# Patient Record
Sex: FEMALE | Race: BLACK OR AFRICAN AMERICAN
[De-identification: names, ages, dates, MRNs, and addresses within clinical notes are randomized per-mention and may not be internally consistent; named-entity substitution may affect disease eponyms.]

---

## 2017-07-26 ENCOUNTER — HOSPITAL ENCOUNTER (INPATIENT)
Dept: HOSPITAL 62 - 5 | Age: 77
LOS: 7 days | Discharge: HOME | DRG: 684 | End: 2017-08-02
Attending: INTERNAL MEDICINE | Admitting: INTERNAL MEDICINE
Payer: MEDICARE

## 2017-07-26 DIAGNOSIS — F32.9: ICD-10-CM

## 2017-07-26 DIAGNOSIS — Z79.899: ICD-10-CM

## 2017-07-26 DIAGNOSIS — E78.5: ICD-10-CM

## 2017-07-26 DIAGNOSIS — Z87.891: ICD-10-CM

## 2017-07-26 DIAGNOSIS — Z79.4: ICD-10-CM

## 2017-07-26 DIAGNOSIS — D50.9: ICD-10-CM

## 2017-07-26 DIAGNOSIS — I12.9: ICD-10-CM

## 2017-07-26 DIAGNOSIS — E11.22: ICD-10-CM

## 2017-07-26 DIAGNOSIS — N18.4: ICD-10-CM

## 2017-07-26 DIAGNOSIS — D63.1: ICD-10-CM

## 2017-07-26 DIAGNOSIS — Z86.73: ICD-10-CM

## 2017-07-26 DIAGNOSIS — Z95.5: ICD-10-CM

## 2017-07-26 DIAGNOSIS — Z82.49: ICD-10-CM

## 2017-07-26 DIAGNOSIS — I48.2: ICD-10-CM

## 2017-07-26 DIAGNOSIS — I25.2: ICD-10-CM

## 2017-07-26 DIAGNOSIS — Z83.3: ICD-10-CM

## 2017-07-26 DIAGNOSIS — I25.10: ICD-10-CM

## 2017-07-26 DIAGNOSIS — N17.9: Primary | ICD-10-CM

## 2017-07-26 LAB
AMYLASE SERPL-CCNC: 194 U/L (ref 30–110)
ANION GAP SERPL CALC-SCNC: 13 MMOL/L (ref 5–19)
APPEARANCE UR: CLEAR
APTT BLD: 35.8 SEC (ref 23.5–35.8)
BARBITURATES UR QL SCN: NEGATIVE
BILIRUB UR QL STRIP: NEGATIVE
BUN SERPL-MCNC: 46 MG/DL (ref 7–20)
CALCIUM: 9.2 MG/DL (ref 8.4–10.2)
CHLORIDE SERPL-SCNC: 101 MMOL/L (ref 98–107)
CK MB SERPL-MCNC: 0.25 NG/ML (ref ?–4.55)
CK MB SERPL-MCNC: 0.27 NG/ML (ref ?–4.55)
CK SERPL-CCNC: 75 U/L (ref 30–135)
CO2 SERPL-SCNC: 28 MMOL/L (ref 22–30)
CREAT SERPL-MCNC: 2.27 MG/DL (ref 0.52–1.25)
CREAT UR-MCNC: 39.1 MG/DL (ref 15–278)
ERYTHROCYTE [DISTWIDTH] IN BLOOD BY AUTOMATED COUNT: 17.9 % (ref 11.5–14)
GLUCOSE SERPL-MCNC: 190 MG/DL (ref 75–110)
GLUCOSE UR STRIP-MCNC: 50 MG/DL
HCT VFR BLD CALC: 24.8 % (ref 36–47)
HGB BLD-MCNC: 8.2 G/DL (ref 12–15.5)
HGB HCT DIFFERENCE: -0.2
KETONES UR STRIP-MCNC: NEGATIVE MG/DL
LIPASE SERPL-CCNC: 483.2 U/L (ref 23–300)
MAGNESIUM SERPL-MCNC: 2.5 MG/DL (ref 1.6–2.3)
MCH RBC QN AUTO: 27.3 PG (ref 27–33.4)
MCHC RBC AUTO-ENTMCNC: 32.9 G/DL (ref 32–36)
MCV RBC AUTO: 83 FL (ref 80–97)
METHADONE UR QL SCN: NEGATIVE
NITRITE UR QL STRIP: NEGATIVE
PCP UR QL SCN: NEGATIVE
PH UR STRIP: 7 [PH] (ref 5–9)
PHOSPHATE SERPL-MCNC: 4.6 MG/DL (ref 2.5–4.5)
POTASSIUM SERPL-SCNC: 3.3 MMOL/L (ref 3.6–5)
POTASSIUM UR-SCNC: 13.3 MMOL/L (ref 22–164)
PROT UR STRIP-MCNC: 396.4 MG/DL (ref ?–12)
PROT UR STRIP-MCNC: >=500 MG/DL
PROTHROMBIN TIME: 13.8 SEC (ref 11.4–15.4)
RBC # BLD AUTO: 2.99 10^6/UL (ref 3.72–5.28)
SODIUM SERPL-SCNC: 142.3 MMOL/L (ref 137–145)
SP GR UR STRIP: 1.01
TROPONIN I SERPL-MCNC: 0.02 NG/ML
TROPONIN I SERPL-MCNC: 0.02 NG/ML
TSH SERPL-ACNC: 1.7 UIU/ML (ref 0.47–4.68)
URINE OPIATES LOW: NEGATIVE
UROBILINOGEN UR-MCNC: NEGATIVE MG/DL (ref ?–2)
WBC # BLD AUTO: 9.4 10^3/UL (ref 4–10.5)

## 2017-07-26 PROCEDURE — 80048 BASIC METABOLIC PNL TOTAL CA: CPT

## 2017-07-26 PROCEDURE — 36415 COLL VENOUS BLD VENIPUNCTURE: CPT

## 2017-07-26 PROCEDURE — 84300 ASSAY OF URINE SODIUM: CPT

## 2017-07-26 PROCEDURE — 82728 ASSAY OF FERRITIN: CPT

## 2017-07-26 PROCEDURE — 83874 ASSAY OF MYOGLOBIN: CPT

## 2017-07-26 PROCEDURE — 85025 COMPLETE CBC W/AUTO DIFF WBC: CPT

## 2017-07-26 PROCEDURE — 82962 GLUCOSE BLOOD TEST: CPT

## 2017-07-26 PROCEDURE — 81001 URINALYSIS AUTO W/SCOPE: CPT

## 2017-07-26 PROCEDURE — 80053 COMPREHEN METABOLIC PANEL: CPT

## 2017-07-26 PROCEDURE — 83735 ASSAY OF MAGNESIUM: CPT

## 2017-07-26 PROCEDURE — 84484 ASSAY OF TROPONIN QUANT: CPT

## 2017-07-26 PROCEDURE — 84156 ASSAY OF PROTEIN URINE: CPT

## 2017-07-26 PROCEDURE — 82784 ASSAY IGA/IGD/IGG/IGM EACH: CPT

## 2017-07-26 PROCEDURE — 87040 BLOOD CULTURE FOR BACTERIA: CPT

## 2017-07-26 PROCEDURE — 84166 PROTEIN E-PHORESIS/URINE/CSF: CPT

## 2017-07-26 PROCEDURE — 82150 ASSAY OF AMYLASE: CPT

## 2017-07-26 PROCEDURE — 80307 DRUG TEST PRSMV CHEM ANLYZR: CPT

## 2017-07-26 PROCEDURE — 85027 COMPLETE CBC AUTOMATED: CPT

## 2017-07-26 PROCEDURE — 82570 ASSAY OF URINE CREATININE: CPT

## 2017-07-26 PROCEDURE — 87077 CULTURE AEROBIC IDENTIFY: CPT

## 2017-07-26 PROCEDURE — 83036 HEMOGLOBIN GLYCOSYLATED A1C: CPT

## 2017-07-26 PROCEDURE — 83550 IRON BINDING TEST: CPT

## 2017-07-26 PROCEDURE — 85610 PROTHROMBIN TIME: CPT

## 2017-07-26 PROCEDURE — 76775 US EXAM ABDO BACK WALL LIM: CPT

## 2017-07-26 PROCEDURE — 84133 ASSAY OF URINE POTASSIUM: CPT

## 2017-07-26 PROCEDURE — 87186 SC STD MICRODIL/AGAR DIL: CPT

## 2017-07-26 PROCEDURE — 84439 ASSAY OF FREE THYROXINE: CPT

## 2017-07-26 PROCEDURE — 84100 ASSAY OF PHOSPHORUS: CPT

## 2017-07-26 PROCEDURE — 83540 ASSAY OF IRON: CPT

## 2017-07-26 PROCEDURE — 85730 THROMBOPLASTIN TIME PARTIAL: CPT

## 2017-07-26 PROCEDURE — 82553 CREATINE MB FRACTION: CPT

## 2017-07-26 PROCEDURE — 83690 ASSAY OF LIPASE: CPT

## 2017-07-26 PROCEDURE — 84443 ASSAY THYROID STIM HORMONE: CPT

## 2017-07-26 PROCEDURE — 82550 ASSAY OF CK (CPK): CPT

## 2017-07-26 PROCEDURE — 87086 URINE CULTURE/COLONY COUNT: CPT

## 2017-07-26 PROCEDURE — 84165 PROTEIN E-PHORESIS SERUM: CPT

## 2017-07-26 PROCEDURE — 86320 SERUM IMMUNOELECTROPHORESIS: CPT

## 2017-07-26 PROCEDURE — 86335 IMMUNFIX E-PHORSIS/URINE/CSF: CPT

## 2017-07-26 PROCEDURE — 82140 ASSAY OF AMMONIA: CPT

## 2017-07-26 RX ADMIN — APIXABAN SCH MG: 2.5 TABLET, FILM COATED ORAL at 17:38

## 2017-07-26 RX ADMIN — ATORVASTATIN CALCIUM SCH MG: 20 TABLET, FILM COATED ORAL at 21:13

## 2017-07-26 RX ADMIN — SODIUM CHLORIDE PRN ML: 9 INJECTION, SOLUTION INTRAVENOUS at 17:39

## 2017-07-26 RX ADMIN — INSULIN LISPRO SCH UNIT: 100 INJECTION, SOLUTION INTRAVENOUS; SUBCUTANEOUS at 21:13

## 2017-07-26 NOTE — PDOC H&P
History of Present Illness


Admission Date/PCP: 


  07/26/17 12:35





  AVIVA POTTER MD





History of Present Illness: 


ASHLEY FUENTES is a 76 year old female, She was admitted directly from 

home because of acute kidney injury.  She had blood work yesterday, the serum 

creatinine was 2.31, the serum creatinine from 6/27/2017 was 2.01, the serum 

creatinine from 03/20/27 was 1.64 there was progressive decline in the 

estimated GFR.  The estimated GFR from 03/20/2017 was 33,, the estimated GFR 

from 06/27/2017 was 26, the estimated GFR from 07/25/2017 was 22 because of the 

rapid decline in the kidney function she was admitted directly to evaluate out 

for acute kidney injury to rule out any prerenal component or any other 

condition that could be contributing to the rapid decline kidney function.  She 

has a history of diabetes mellitus with nephropathy.  She has other comorbid 

conditions including CVA, chronic atrial fibrillation.








Past Medical History


Cardiac Medical History: Reports: Atrial Fibrillation, Coronary Artery Disease, 

Hyperlipidema, Hypertension


Neurological Medical History: Reports: Ischemic CVA


Endocrine Medical History: Reports: Diabetes Mellitus Type 2


Psychiatric Medical History: Reports: Depression





Past Surgical History


Past Surgical History: Reports: Cardiac Catheterization - stent 2004


   Denies: Hysterectomy, Pacemaker





Social History


Smoking Status: Former Smoker


Frequency of Alcohol Use: None


Hx Recreational Drug Use: No


Hx Prescription Drug Abuse: No





Family History


Parental Family History Reviewed: Yes


Children Family History Reviewed: Yes


Sibling(s) Family History Reviewed.: Yes





Medication/Allergy


Home Medications: 








Apixaban [Eliquis 2.5 mg Tablet] 2.5 mg PO BID 07/26/17 


Insulin Aspart [Novolog Flexpen] 8 units SUBCUT QHS 07/26/17 


Insulin Glargine,Hum.rec.anlog [Lantus Insulin 100 Unit/mL] 15 units SUBCUT QAM 

07/26/17 


Linaclotide [Linzess] 290 mcg PO DAILY 07/26/17 


Pioglitazone HCl [Actos] 30 mg PO DAILY 07/26/17 


RX: Amlodipine Besylate [Norvasc 10 mg Tablet] 10 mg PO DAILY 07/26/17 


RX: Ergocalciferol (Vitamin D2) [Drisdol 50,000 unit (1.25MG) Capsule] 50,000 

unit PO WE@1000 07/26/17 


RX: Metoprolol Succinate [Toprol  mg Tablet] 200 mg PO DAILY 07/26/17 


RX: Omeprazole 40 mg PO DAILY 07/26/17 


RX: Pravastatin Sodium [Pravachol] 80 mg PO DAILY 07/26/17 


RX: Sertraline HCl [Zoloft] 100 mg PO DAILY 07/26/17 


Tramadol HCl/Acetaminophen [Ultracet 37.5 mg/325 mg Tablet] 1 tab PO Q6HP PRN 07 /26/17 


Valsartan/Hydrochlorothiazide [Diovan Hct 320-25 mg Tablet] 1 tab PO DAILY 07/26 /17 








Allergies/Adverse Reactions: 


 





No Known Allergies Allergy (Unverified 12/02/11 18:04)


 











Physical Exam


Vital Signs: 


 











Temp Pulse Resp BP Pulse Ox


 


 98.0 F   70   18   170/64 H  100 


 


 07/26/17 14:25  07/26/17 14:25  07/26/17 14:25  07/26/17 14:25  07/26/17 14:25








 Intake & Output











 07/25/17 07/26/17 07/27/17





 06:59 06:59 06:59


 


Weight   59.2 kg











General appearance: PRESENT: no acute distress


Head exam: PRESENT: atraumatic, normocephalic


Eye exam: PRESENT: conjunctiva pink, EOMI, PERRLA


Ear exam: PRESENT: normal external ear exam


Mouth exam: PRESENT: moist, tongue midline


Neck exam: PRESENT: full ROM


Cardiovascular exam: PRESENT: RRR, +S1, +S2


Vascular exam: PRESENT: normal capillary refill


GI/Abdominal exam: PRESENT: normal bowel sounds, soft


Rectal exam: PRESENT: deferred


Neurological exam: PRESENT: alert, motor sensory deficit


Psychiatric exam: PRESENT: appropriate affect, normal mood


Skin exam: PRESENT: dry, intact, warm





Results


Laboratory Results: 


 





 07/26/17 13:27 





 07/26/17 13:27 





 











  07/26/17 07/26/17 07/26/17





  13:27 13:27 13:27


 


WBC  9.4  


 


RBC  2.99 L  


 


Hgb  8.2 L  


 


Hct  24.8 L  


 


MCV  83  


 


MCH  27.3  


 


MCHC  32.9  


 


RDW  17.9 H  


 


Plt Count  265  


 


Sodium   142.3 


 


Potassium   3.3 L 


 


Chloride   101 


 


Carbon Dioxide   28 


 


Anion Gap   13 


 


BUN   46 H 


 


Creatinine   2.27 H 


 


Est GFR ( Amer)   25 L 


 


Est GFR (Non-Af Amer)   21 L 


 


Glucose   190 H 


 


Calcium   9.2 


 


Phosphorus    4.6 H


 


Magnesium    2.5 H


 


Ammonia   


 


Amylase    194 H


 


Lipase    483.2 H


 


TSH   


 


Free T4   


 


Urine Color   


 


Urine Appearance   


 


Urine pH   


 


Ur Specific Gravity   


 


Urine Protein   


 


Urine Glucose (UA)   


 


Urine Ketones   


 


Urine Blood   


 


Urine Nitrite   


 


Ur Leukocyte Esterase   


 


Urine WBC (Auto)   


 


Urine RBC (Auto)   














  07/26/17 07/26/17 07/26/17





  13:27 14:26 16:33


 


WBC   


 


RBC   


 


Hgb   


 


Hct   


 


MCV   


 


MCH   


 


MCHC   


 


RDW   


 


Plt Count   


 


Sodium   


 


Potassium   


 


Chloride   


 


Carbon Dioxide   


 


Anion Gap   


 


BUN   


 


Creatinine   


 


Est GFR ( Amer)   


 


Est GFR (Non-Af Amer)   


 


Glucose   


 


Calcium   


 


Phosphorus   


 


Magnesium   


 


Ammonia    < 8.7 L


 


Amylase   


 


Lipase   


 


TSH  1.70  


 


Free T4  1.60  


 


Urine Color   STRAW 


 


Urine Appearance   CLEAR 


 


Urine pH   7.0 


 


Ur Specific Gravity   1.007 


 


Urine Protein   >=500 H 


 


Urine Glucose (UA)   50 H 


 


Urine Ketones   NEGATIVE 


 


Urine Blood   NEGATIVE 


 


Urine Nitrite   NEGATIVE 


 


Ur Leukocyte Esterase   NEGATIVE 


 


Urine WBC (Auto)   5 


 


Urine RBC (Auto)   2 








 











  07/26/17 07/26/17 07/26/17





  13:27 13:27 13:27


 


Creatine Kinase  75  Cancelled 


 


CK-MB (CK-2)    0.27


 


Troponin I    0.018














Assessment & Plan





- Diagnosis


(1) Acute kidney injury


Is this a current diagnosis for this admission?: YesPlan: 


She has chronic kidney disease with nephropathy from diabetes, there is rapid 

progressive decline of kidney function, she was admitted to rule out any 

potential etiology of acute kidney injury, she has significant proteinuria, we 

need to rule out paraproteinemia especially multiple myeloma








(2) Chronic atrial fibrillation


Is this a current diagnosis for this admission?: Yes





(3) Diabetes mellitus


Qualifiers: 


     Diabetes mellitus type: type 2     Diabetes mellitus complication status: 

with kidney complications     Diabetes mellitus complication detail: with 

chronic kidney disease     Diabetes mellitus long term insulin use: with long 

term use     Chronic kidney disease stage: stage 4 (severe)        Qualified 

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

; N18.4 - Chronic kidney disease, stage 4 (severe); Z79.4 - Long term (current) 

use of insulin  


Is this a current diagnosis for this admission?: Yes

## 2017-07-27 LAB
ALBUMIN SERPL-MCNC: 3.4 G/DL (ref 3.5–5)
ALBUMIN SERPL-MCNC: 3.5 G/DL (ref 3.5–5)
ALP SERPL-CCNC: 51 U/L (ref 38–126)
ALP SERPL-CCNC: 69 U/L (ref 38–126)
ALT SERPL-CCNC: 24 U/L (ref 9–52)
ALT SERPL-CCNC: 24 U/L (ref 9–52)
ANION GAP SERPL CALC-SCNC: 10 MMOL/L (ref 5–19)
ANION GAP SERPL CALC-SCNC: 13 MMOL/L (ref 5–19)
AST SERPL-CCNC: 14 U/L (ref 14–36)
AST SERPL-CCNC: 14 U/L (ref 14–36)
BASOPHILS # BLD AUTO: 0.1 10^3/UL (ref 0–0.2)
BASOPHILS NFR BLD AUTO: 0.7 % (ref 0–2)
BILIRUB DIRECT SERPL-MCNC: 0.3 MG/DL (ref 0–0.4)
BILIRUB DIRECT SERPL-MCNC: 0.3 MG/DL (ref 0–0.4)
BILIRUB SERPL-MCNC: 0.3 MG/DL (ref 0.2–1.3)
BILIRUB SERPL-MCNC: 0.4 MG/DL (ref 0.2–1.3)
BUN SERPL-MCNC: 34 MG/DL (ref 7–20)
BUN SERPL-MCNC: 38 MG/DL (ref 7–20)
CALCIUM: 8.6 MG/DL (ref 8.4–10.2)
CALCIUM: 8.8 MG/DL (ref 8.4–10.2)
CHLORIDE SERPL-SCNC: 103 MMOL/L (ref 98–107)
CHLORIDE SERPL-SCNC: 106 MMOL/L (ref 98–107)
CK MB SERPL-MCNC: 0.33 NG/ML (ref ?–4.55)
CK SERPL-CCNC: 70 U/L (ref 30–135)
CO2 SERPL-SCNC: 26 MMOL/L (ref 22–30)
CO2 SERPL-SCNC: 27 MMOL/L (ref 22–30)
CREAT SERPL-MCNC: 1.84 MG/DL (ref 0.52–1.25)
CREAT SERPL-MCNC: 1.92 MG/DL (ref 0.52–1.25)
EOSINOPHIL # BLD AUTO: 0.2 10^3/UL (ref 0–0.6)
EOSINOPHIL NFR BLD AUTO: 2.2 % (ref 0–6)
ERYTHROCYTE [DISTWIDTH] IN BLOOD BY AUTOMATED COUNT: 17.5 % (ref 11.5–14)
GLUCOSE SERPL-MCNC: 114 MG/DL (ref 75–110)
GLUCOSE SERPL-MCNC: 191 MG/DL (ref 75–110)
HCT VFR BLD CALC: 25.8 % (ref 36–47)
HGB BLD-MCNC: 8.4 G/DL (ref 12–15.5)
HGB HCT DIFFERENCE: -0.6
LYMPHOCYTES # BLD AUTO: 2.2 10^3/UL (ref 0.5–4.7)
LYMPHOCYTES NFR BLD AUTO: 26.4 % (ref 13–45)
MCH RBC QN AUTO: 27.4 PG (ref 27–33.4)
MCHC RBC AUTO-ENTMCNC: 32.4 G/DL (ref 32–36)
MCV RBC AUTO: 85 FL (ref 80–97)
MONOCYTES # BLD AUTO: 0.6 10^3/UL (ref 0.1–1.4)
MONOCYTES NFR BLD AUTO: 7.6 % (ref 3–13)
NEUTROPHILS # BLD AUTO: 5.3 10^3/UL (ref 1.7–8.2)
NEUTS SEG NFR BLD AUTO: 63.1 % (ref 42–78)
POTASSIUM SERPL-SCNC: 2.9 MMOL/L (ref 3.6–5)
POTASSIUM SERPL-SCNC: 3.4 MMOL/L (ref 3.6–5)
PROT SERPL-MCNC: 6.4 G/DL (ref 6.3–8.2)
PROT SERPL-MCNC: 6.7 G/DL (ref 6.3–8.2)
RBC # BLD AUTO: 3.05 10^6/UL (ref 3.72–5.28)
SODIUM SERPL-SCNC: 142.1 MMOL/L (ref 137–145)
SODIUM SERPL-SCNC: 142.5 MMOL/L (ref 137–145)
TROPONIN I SERPL-MCNC: 0.02 NG/ML
WBC # BLD AUTO: 8.4 10^3/UL (ref 4–10.5)

## 2017-07-27 RX ADMIN — INSULIN LISPRO SCH UNIT: 100 INJECTION, SOLUTION INTRAVENOUS; SUBCUTANEOUS at 21:22

## 2017-07-27 RX ADMIN — POTASSIUM CHLORIDE SCH MEQ: 1.5 SOLUTION ORAL at 10:14

## 2017-07-27 RX ADMIN — POTASSIUM CHLORIDE SCH MEQ: 1.5 SOLUTION ORAL at 06:10

## 2017-07-27 RX ADMIN — INSULIN GLARGINE SCH UNIT: 100 INJECTION, SOLUTION SUBCUTANEOUS at 07:49

## 2017-07-27 RX ADMIN — APIXABAN SCH MG: 2.5 TABLET, FILM COATED ORAL at 10:20

## 2017-07-27 RX ADMIN — ATORVASTATIN CALCIUM SCH MG: 20 TABLET, FILM COATED ORAL at 21:20

## 2017-07-27 RX ADMIN — LANSOPRAZOLE SCH MG: 30 TABLET, ORALLY DISINTEGRATING, DELAYED RELEASE ORAL at 10:15

## 2017-07-27 RX ADMIN — AMLODIPINE BESYLATE SCH MG: 10 TABLET ORAL at 10:20

## 2017-07-27 RX ADMIN — SERTRALINE HYDROCHLORIDE SCH MG: 50 TABLET ORAL at 10:21

## 2017-07-27 RX ADMIN — METOPROLOL SUCCINATE SCH MG: 50 TABLET, EXTENDED RELEASE ORAL at 10:19

## 2017-07-27 RX ADMIN — APIXABAN SCH MG: 2.5 TABLET, FILM COATED ORAL at 17:13

## 2017-07-27 NOTE — RADIOLOGY REPORT (SQ)
EXAM DESCRIPTION:  [facility]



COMPLETED DATE/TIME:  7/27/2017 12:16 pm



REASON FOR STUDY:  ACUTE KIDNEY INJURY



COMPARISON:  None.



TECHNIQUE:  Dynamic and static grayscale images acquired of the kidneys and bladder and recorded on P
ACS. Additional selected color Doppler and spectral images recorded.



LIMITATIONS:  None.



FINDINGS:  RIGHT KIDNEY:  9.2 cm.   1 cm cyst upper pole.   No solid or suspicious masses.   No hydro
nephrosis.   No calcifications.

LEFT KIDNEY:  9.5 cm.   Normal echogenicity.   No solid or suspicious masses.   No hydronephrosis.   
No calcifications.

BLADDER: No masses.

OTHER FINDINGS: No other significant finding.



IMPRESSION:  No hydronephrosis.  No evidence of hematoma.



TECHNICAL DOCUMENTATION:  JOB ID:  [id number]

 2011 [employer]- All Rights Reserved

## 2017-07-27 NOTE — PDOC PROGRESS REPORT
Subjective


Progress Note for:: 07/27/17


Subjective:: 


She was admitted yesterday because of acute kidney injury superimposed on 

chronic kidney disease, she has nephrotic range proteinuria, the urine protein 

electrophoresis is pending, consultation will be requested from nephrology, she 

is on IV fluid that is slight improvement with a serum creatinine suggesting a 

prerenal component,





Physical Exam


Vital Signs: 


 











Temp Pulse Resp BP Pulse Ox


 


 98.5 F   65   16   159/69 H  95 


 


 07/27/17 15:04  07/27/17 19:00  07/27/17 15:04  07/27/17 15:04  07/27/17 15:04








 Intake & Output











 07/26/17 07/27/17 07/28/17





 06:59 06:59 06:59


 


Intake Total  2218 2120


 


Balance  2218 2120


 


Weight  60.5 kg 











General appearance: PRESENT: no acute distress


Eye exam: PRESENT: PERRLA


Respiratory exam: PRESENT: clear to auscultation emilia


Cardiovascular exam: PRESENT: +S1, +S2


GI/Abdominal exam: PRESENT: soft


Neurological exam: PRESENT: alert, CN II-XII grossly intact





Results


Laboratory Results: 


 





 07/27/17 03:50 





 07/27/17 03:50 





 











  07/26/17 07/27/17 07/27/17





  21:52 03:50 03:50


 


WBC   8.4 


 


RBC   3.05 L 


 


Hgb   8.4 L 


 


Hct   25.8 L 


 


MCV   85 


 


MCH   27.4 


 


MCHC   32.4 


 


RDW   17.5 H 


 


Plt Count   196 


 


Seg Neutrophils %   63.1 


 


Lymphocytes %   26.4 


 


Monocytes %   7.6 


 


Eosinophils %   2.2 


 


Basophils %   0.7 


 


Absolute Neutrophils   5.3 


 


Absolute Lymphocytes   2.2 


 


Absolute Monocytes   0.6 


 


Absolute Eosinophils   0.2 


 


Absolute Basophils   0.1 


 


Sodium    142.1


 


Potassium    2.9 L*


 


Chloride    103


 


Carbon Dioxide    26


 


Anion Gap    13


 


BUN    38 H


 


Creatinine    1.92 H


 


Est GFR ( Amer)    31 L


 


Est GFR (Non-Af Amer)    26 L


 


Glucose    114 H


 


Calcium    8.8


 


Total Bilirubin    0.4


 


AST    14


 


ALT    24


 


Alkaline Phosphatase    51


 


Total Protein  Cancelled   6.4


 


Albumin  Cancelled   3.4 L








 











  07/26/17 07/26/17 07/26/17





  13:27 13:27 13:27


 


Creatine Kinase  75  Cancelled 


 


CK-MB (CK-2)    0.27


 


Troponin I    0.018














  07/26/17 07/26/17 07/27/17





  21:40 21:52 03:50


 


Creatine Kinase   76 


 


CK-MB (CK-2)  0.25   0.33


 


Troponin I  0.022   0.022














  07/27/17





  03:50


 


Creatine Kinase  70


 


CK-MB (CK-2) 


 


Troponin I 











Impressions: 


 





Renal Ultrasound  07/27/17 00:00


IMPRESSION:  No hydronephrosis.  No evidence of hematoma.


 














Assessment & Plan





- Diagnosis


(1) Acute kidney injury


Is this a current diagnosis for this admission?: Yes





(2) Chronic atrial fibrillation


Is this a current diagnosis for this admission?: Yes





(3) Diabetes mellitus


Qualifiers: 


     Diabetes mellitus type: type 2     Diabetes mellitus complication status: 

with kidney complications     Diabetes mellitus complication detail: with 

chronic kidney disease     Diabetes mellitus long term insulin use: with long 

term use     Chronic kidney disease stage: stage 4 (severe)        Qualified 

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

; N18.1 - Chronic kidney disease, stage 1; Z79.4 - Long term (current) use of 

insulin  


Is this a current diagnosis for this admission?: Yes





(4) Nephrotic range proteinuria


Is this a current diagnosis for this admission?: YesPlan: 


She has nephrotic range proteinuria, the urine protein creatinine ratio is 10 

this correlates to 10 g protein in 24 hours

## 2017-07-28 LAB
ALBUMIN SERPL-MCNC: 3.8 G/DL (ref 3.5–5)
ALBUMIN UR: 61.5 %
ALP SERPL-CCNC: 58 U/L (ref 38–126)
ALT SERPL-CCNC: 16 U/L (ref 9–52)
ANION GAP SERPL CALC-SCNC: 12 MMOL/L (ref 5–19)
AST SERPL-CCNC: 15 U/L (ref 14–36)
BASOPHILS # BLD AUTO: 0 10^3/UL (ref 0–0.2)
BASOPHILS NFR BLD AUTO: 0.4 % (ref 0–2)
BILIRUB DIRECT SERPL-MCNC: 0.3 MG/DL (ref 0–0.4)
BILIRUB SERPL-MCNC: 0.3 MG/DL (ref 0.2–1.3)
BUN SERPL-MCNC: 26 MG/DL (ref 7–20)
CALCIUM: 9 MG/DL (ref 8.4–10.2)
CHLORIDE SERPL-SCNC: 107 MMOL/L (ref 98–107)
CO2 SERPL-SCNC: 26 MMOL/L (ref 22–30)
CREAT SERPL-MCNC: 1.53 MG/DL (ref 0.52–1.25)
EOSINOPHIL # BLD AUTO: 0.2 10^3/UL (ref 0–0.6)
EOSINOPHIL NFR BLD AUTO: 2.2 % (ref 0–6)
ERYTHROCYTE [DISTWIDTH] IN BLOOD BY AUTOMATED COUNT: 17.7 % (ref 11.5–14)
GAMMA GLOB 24H MFR UR ELPH: 10.3 %
GLUCOSE SERPL-MCNC: 95 MG/DL (ref 75–110)
HCT VFR BLD CALC: 26.5 % (ref 36–47)
HGB BLD-MCNC: 8.9 G/DL (ref 12–15.5)
HGB HCT DIFFERENCE: 0.2
LYMPHOCYTES # BLD AUTO: 2 10^3/UL (ref 0.5–4.7)
LYMPHOCYTES NFR BLD AUTO: 22.2 % (ref 13–45)
MCH RBC QN AUTO: 27.9 PG (ref 27–33.4)
MCHC RBC AUTO-ENTMCNC: 33.6 G/DL (ref 32–36)
MCV RBC AUTO: 83 FL (ref 80–97)
MONOCYTES # BLD AUTO: 0.8 10^3/UL (ref 0.1–1.4)
MONOCYTES NFR BLD AUTO: 8.9 % (ref 3–13)
NEUTROPHILS # BLD AUTO: 6 10^3/UL (ref 1.7–8.2)
NEUTS SEG NFR BLD AUTO: 66.3 % (ref 42–78)
POTASSIUM SERPL-SCNC: 3.6 MMOL/L (ref 3.6–5)
PROT SERPL-MCNC: 7 G/DL (ref 6.3–8.2)
RBC # BLD AUTO: 3.19 10^6/UL (ref 3.72–5.28)
SODIUM SERPL-SCNC: 144.5 MMOL/L (ref 137–145)
WBC # BLD AUTO: 9.1 10^3/UL (ref 4–10.5)

## 2017-07-28 RX ADMIN — VALSARTAN SCH MG: 160 TABLET ORAL at 22:13

## 2017-07-28 RX ADMIN — INSULIN LISPRO SCH UNIT: 100 INJECTION, SOLUTION INTRAVENOUS; SUBCUTANEOUS at 22:22

## 2017-07-28 RX ADMIN — AMLODIPINE BESYLATE SCH MG: 10 TABLET ORAL at 11:00

## 2017-07-28 RX ADMIN — SERTRALINE HYDROCHLORIDE SCH MG: 50 TABLET ORAL at 09:45

## 2017-07-28 RX ADMIN — SODIUM CHLORIDE PRN ML: 9 INJECTION, SOLUTION INTRAVENOUS at 22:26

## 2017-07-28 RX ADMIN — SODIUM CHLORIDE PRN ML: 9 INJECTION, SOLUTION INTRAVENOUS at 11:28

## 2017-07-28 RX ADMIN — INSULIN GLARGINE SCH UNIT: 100 INJECTION, SOLUTION SUBCUTANEOUS at 08:29

## 2017-07-28 RX ADMIN — ATORVASTATIN CALCIUM SCH MG: 20 TABLET, FILM COATED ORAL at 22:26

## 2017-07-28 RX ADMIN — APIXABAN SCH MG: 2.5 TABLET, FILM COATED ORAL at 09:45

## 2017-07-28 RX ADMIN — APIXABAN SCH MG: 2.5 TABLET, FILM COATED ORAL at 18:08

## 2017-07-28 RX ADMIN — LANSOPRAZOLE SCH MG: 30 TABLET, ORALLY DISINTEGRATING, DELAYED RELEASE ORAL at 09:45

## 2017-07-28 RX ADMIN — METOPROLOL SUCCINATE SCH MG: 50 TABLET, EXTENDED RELEASE ORAL at 10:59

## 2017-07-28 NOTE — PDOC PROGRESS REPORT
Subjective


Progress Note for:: 07/28/17


Subjective:: 


She was seen by the bedside, she was admitted because of acute kidney injury, 

she has nephrotic range proteinuria most likely due to diabetes nephropathy 

other potential etiology is being evaluated including multiple myeloma.  The 

serum creatinine continues to improve with hydration suggesting a prerenal 

component, she is on IV fluid, she will continue the same management the blood 

pressure was elevated earlier today.





Physical Exam


Vital Signs: 


 











Temp Pulse Resp BP Pulse Ox


 


 98.2 F   63   16   180/70 H  95 


 


 07/28/17 16:00  07/28/17 16:00  07/28/17 16:00  07/28/17 16:00  07/28/17 16:00








 Intake & Output











 07/27/17 07/28/17 07/29/17





 06:59 06:59 06:59


 


Intake Total 2218 3420 1540


 


Output Total  250 


 


Balance 2218 3170 1540


 


Weight 60.5 kg 61.5 kg 











General appearance: PRESENT: no acute distress


Eye exam: PRESENT: PERRLA


Respiratory exam: PRESENT: clear to auscultation emilia


Cardiovascular exam: PRESENT: +S1, +S2


GI/Abdominal exam: PRESENT: soft


Neurological exam: PRESENT: alert, CN II-XII grossly intact





Results


Laboratory Results: 


 





 07/28/17 04:10 





 07/28/17 18:47 





 











  07/27/17 07/28/17 07/28/17





  20:50 04:10 18:47


 


WBC   9.1 


 


RBC   3.19 L 


 


Hgb   8.9 L 


 


Hct   26.5 L 


 


MCV   83 


 


MCH   27.9 


 


MCHC   33.6 


 


RDW   17.7 H 


 


Plt Count   232 


 


Seg Neutrophils %   66.3 


 


Lymphocytes %   22.2 


 


Monocytes %   8.9 


 


Eosinophils %   2.2 


 


Basophils %   0.4 


 


Absolute Neutrophils   6.0 


 


Absolute Lymphocytes   2.0 


 


Absolute Monocytes   0.8 


 


Absolute Eosinophils   0.2 


 


Absolute Basophils   0.0 


 


Sodium  142.5   144.5


 


Potassium  3.4 L   3.6


 


Chloride  106   107


 


Carbon Dioxide  27   26


 


Anion Gap  10   12


 


BUN  34 H   26 H


 


Creatinine  1.84 H   1.53 H


 


Est GFR ( Amer)  32 L   40 L


 


Est GFR (Non-Af Amer)  27 L   33 L


 


Glucose  191 H   95


 


Calcium  8.6   9.0


 


Total Bilirubin  0.3   0.3


 


AST  14   15


 


ALT  24   16


 


Alkaline Phosphatase  69   58


 


Total Protein  6.7   7.0


 


Albumin  3.5   3.8








 





07/26/17 14:26   Clean Catch Midstream   Urine Culture - Final


                            Mixed Urogenital Mary





 











  07/26/17 07/26/17 07/26/17





  13:27 13:27 13:27


 


Creatine Kinase  75  Cancelled 


 


CK-MB (CK-2)    0.27


 


Troponin I    0.018














  07/26/17 07/26/17 07/27/17





  21:40 21:52 03:50


 


Creatine Kinase   76 


 


CK-MB (CK-2)  0.25   0.33


 


Troponin I  0.022   0.022














  07/27/17





  03:50


 


Creatine Kinase  70


 


CK-MB (CK-2) 


 


Troponin I 











Impressions: 


 





Renal Ultrasound  07/27/17 00:00


IMPRESSION:  No hydronephrosis.  No evidence of hematoma.


 














Assessment & Plan





- Diagnosis


(1) Acute kidney injury


Is this a current diagnosis for this admission?: Yes





(2) Chronic atrial fibrillation


Is this a current diagnosis for this admission?: Yes





(3) Diabetes mellitus


Qualifiers: 


     Diabetes mellitus type: type 2     Diabetes mellitus complication status: 

with kidney complications     Diabetes mellitus complication detail: with 

chronic kidney disease     Diabetes mellitus long term insulin use: with long 

term use     Chronic kidney disease stage: stage 4 (severe)        Qualified 

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

; N18.1 - Chronic kidney disease, stage 1; Z79.4 - Long term (current) use of 

insulin  


Is this a current diagnosis for this admission?: Yes





(4) Nephrotic range proteinuria


Is this a current diagnosis for this admission?: Yes








- Plan Summary


Plan Summary: 


She will continue IV fluid therapy, serum creatinine continues to improve 

suggesting a prerenal component, she has nephropathy with nephrotic renal 

proteinuria partly due to diabetes mellitus, on admission the Diovan was held  

because of acute kidney injury ,in light of the significant proteinuria and  

elevated blood pressure the Diovan was started today. consultation also 

requested from nephrology

## 2017-07-29 LAB
ALBUMIN SERPL-MCNC: 3.9 G/DL (ref 3.5–5)
ALP SERPL-CCNC: 58 U/L (ref 38–126)
ALT SERPL-CCNC: 15 U/L (ref 9–52)
ANION GAP SERPL CALC-SCNC: 10 MMOL/L (ref 5–19)
AST SERPL-CCNC: 18 U/L (ref 14–36)
BASOPHILS # BLD AUTO: 0.1 10^3/UL (ref 0–0.2)
BASOPHILS NFR BLD AUTO: 1.1 % (ref 0–2)
BILIRUB DIRECT SERPL-MCNC: 0.3 MG/DL (ref 0–0.4)
BILIRUB SERPL-MCNC: 0.3 MG/DL (ref 0.2–1.3)
BUN SERPL-MCNC: 23 MG/DL (ref 7–20)
CALCIUM: 8.9 MG/DL (ref 8.4–10.2)
CHLORIDE SERPL-SCNC: 109 MMOL/L (ref 98–107)
CO2 SERPL-SCNC: 27 MMOL/L (ref 22–30)
CREAT SERPL-MCNC: 1.41 MG/DL (ref 0.52–1.25)
EOSINOPHIL # BLD AUTO: 0.3 10^3/UL (ref 0–0.6)
EOSINOPHIL NFR BLD AUTO: 3.1 % (ref 0–6)
ERYTHROCYTE [DISTWIDTH] IN BLOOD BY AUTOMATED COUNT: 17.9 % (ref 11.5–14)
GLUCOSE SERPL-MCNC: 88 MG/DL (ref 75–110)
HCT VFR BLD CALC: 29.2 % (ref 36–47)
HGB BLD-MCNC: 9.5 G/DL (ref 12–15.5)
HGB HCT DIFFERENCE: -0.7
LYMPHOCYTES # BLD AUTO: 1.9 10^3/UL (ref 0.5–4.7)
LYMPHOCYTES NFR BLD AUTO: 17.7 % (ref 13–45)
MCH RBC QN AUTO: 27 PG (ref 27–33.4)
MCHC RBC AUTO-ENTMCNC: 32.5 G/DL (ref 32–36)
MCV RBC AUTO: 83 FL (ref 80–97)
MONOCYTES # BLD AUTO: 0.8 10^3/UL (ref 0.1–1.4)
MONOCYTES NFR BLD AUTO: 7.3 % (ref 3–13)
NEUTROPHILS # BLD AUTO: 7.5 10^3/UL (ref 1.7–8.2)
NEUTS SEG NFR BLD AUTO: 70.8 % (ref 42–78)
POTASSIUM SERPL-SCNC: 3.3 MMOL/L (ref 3.6–5)
PROT SERPL-MCNC: 7.4 G/DL (ref 6.3–8.2)
RBC # BLD AUTO: 3.52 10^6/UL (ref 3.72–5.28)
SODIUM SERPL-SCNC: 145.7 MMOL/L (ref 137–145)
WBC # BLD AUTO: 10.6 10^3/UL (ref 4–10.5)

## 2017-07-29 RX ADMIN — APIXABAN SCH MG: 2.5 TABLET, FILM COATED ORAL at 17:56

## 2017-07-29 RX ADMIN — SERTRALINE HYDROCHLORIDE SCH MG: 50 TABLET ORAL at 10:10

## 2017-07-29 RX ADMIN — LANSOPRAZOLE SCH MG: 30 TABLET, ORALLY DISINTEGRATING, DELAYED RELEASE ORAL at 10:10

## 2017-07-29 RX ADMIN — INSULIN LISPRO SCH UNIT: 100 INJECTION, SOLUTION INTRAVENOUS; SUBCUTANEOUS at 21:18

## 2017-07-29 RX ADMIN — INSULIN GLARGINE SCH UNIT: 100 INJECTION, SOLUTION SUBCUTANEOUS at 09:09

## 2017-07-29 RX ADMIN — SODIUM CHLORIDE PRN ML: 9 INJECTION, SOLUTION INTRAVENOUS at 19:09

## 2017-07-29 RX ADMIN — ATORVASTATIN CALCIUM SCH MG: 20 TABLET, FILM COATED ORAL at 21:18

## 2017-07-29 RX ADMIN — SODIUM CHLORIDE PRN ML: 9 INJECTION, SOLUTION INTRAVENOUS at 08:08

## 2017-07-29 RX ADMIN — METOPROLOL SUCCINATE SCH MG: 50 TABLET, EXTENDED RELEASE ORAL at 10:10

## 2017-07-29 RX ADMIN — CLONIDINE HYDROCHLORIDE PRN MG: 0.1 TABLET ORAL at 08:04

## 2017-07-29 RX ADMIN — VALSARTAN SCH MG: 160 TABLET ORAL at 21:18

## 2017-07-29 RX ADMIN — AMLODIPINE BESYLATE SCH MG: 10 TABLET ORAL at 10:10

## 2017-07-29 RX ADMIN — VALSARTAN SCH MG: 160 TABLET ORAL at 10:09

## 2017-07-29 RX ADMIN — APIXABAN SCH MG: 2.5 TABLET, FILM COATED ORAL at 10:10

## 2017-07-29 NOTE — PDOC PROGRESS REPORT
Subjective


Progress Note for:: 07/29/17


Subjective:: 


No chest pain or difficulty with breathing. There was recurrent severely 

elevated blood pressure this morning necessitating administration of Clonidine. 

No nausea or vomiting. No abdominal pain. No headache or dizziness. 





Physical Exam


Vital Signs: 


 











Temp Pulse Resp BP Pulse Ox


 


 97.9 F   68   12   212/78 H  99 


 


 07/29/17 07:31  07/29/17 07:31  07/29/17 07:31  07/29/17 07:31  07/29/17 07:31








 Intake & Output











 07/28/17 07/29/17 07/30/17





 06:59 06:59 06:59


 


Intake Total 3420 1540 


 


Output Total 250  


 


Balance 3170 1540 


 


Weight 61.5 kg 62.5 kg 











General appearance: PRESENT: no acute distress, cooperative


Head exam: PRESENT: atraumatic, normocephalic


Eye exam: PRESENT: conjunctiva pink, EOMI, PERRLA.  ABSENT: scleral icterus


Respiratory exam: PRESENT: clear to auscultation emilia


Cardiovascular exam: PRESENT: RRR, +S1, +S2


GI/Abdominal exam: PRESENT: normal bowel sounds, soft.  ABSENT: distended, 

guarding, mass, organolmegaly, rebound, tenderness


Extremities exam: ABSENT: pedal edema


Musculoskeletal exam: PRESENT: normal inspection


Neurological exam: PRESENT: alert, CN II-XII grossly intact, motor sensory 

deficit


Psychiatric exam: PRESENT: appropriate affect, normal mood.  ABSENT: homicidal 

ideation, suicidal ideation


Skin exam: PRESENT: dry, intact, warm.  ABSENT: cyanosis, rash





Results


Laboratory Results: 


 





 07/29/17 06:02 





 07/29/17 06:02 





 











  07/28/17 07/29/17 07/29/17





  18:47 06:02 06:02


 


WBC   10.6 H 


 


RBC   3.52 L 


 


Hgb   9.5 L 


 


Hct   29.2 L 


 


MCV   83 


 


MCH   27.0 


 


MCHC   32.5 


 


RDW   17.9 H 


 


Plt Count   246 


 


Seg Neutrophils %   70.8 


 


Lymphocytes %   17.7 


 


Monocytes %   7.3 


 


Eosinophils %   3.1 


 


Basophils %   1.1 


 


Absolute Neutrophils   7.5 


 


Absolute Lymphocytes   1.9 


 


Absolute Monocytes   0.8 


 


Absolute Eosinophils   0.3 


 


Absolute Basophils   0.1 


 


Sodium  144.5   145.7 H


 


Potassium  3.6   3.3 L


 


Chloride  107   109 H


 


Carbon Dioxide  26   27


 


Anion Gap  12   10


 


BUN  26 H   23 H


 


Creatinine  1.53 H   1.41 H


 


Est GFR ( Amer)  40 L   44 L


 


Est GFR (Non-Af Amer)  33 L   36 L


 


Glucose  95   88


 


Calcium  9.0   8.9


 


Total Bilirubin  0.3   0.3


 


AST  15   18


 


ALT  16   15


 


Alkaline Phosphatase  58   58


 


Total Protein  7.0   7.4


 


Albumin  3.8   3.9








 





07/26/17 14:26   Clean Catch Midstream   Urine Culture - Final


                            Mixed Urogenital Mary





 











  07/26/17 07/26/17 07/26/17





  13:27 13:27 13:27


 


Creatine Kinase  75  Cancelled 


 


CK-MB (CK-2)    0.27


 


Troponin I    0.018














  07/26/17 07/26/17 07/27/17





  21:40 21:52 03:50


 


Creatine Kinase   76 


 


CK-MB (CK-2)  0.25   0.33


 


Troponin I  0.022   0.022














  07/27/17





  03:50


 


Creatine Kinase  70


 


CK-MB (CK-2) 


 


Troponin I 











Impressions: 


 





Renal Ultrasound  07/27/17 00:00


IMPRESSION:  No hydronephrosis.  No evidence of hematoma.


 














Assessment & Plan





- Diagnosis


(1) Acute kidney injury


Is this a current diagnosis for this admission?: YesPlan: 


Continue gentle IV Hydration in view of her elevated blood pressure. Follow up 

on pending labs.








(2) Hypertension, uncontrolled


Plan: 





Maintain on current anti HTN medication management. Already max out on Diovan, 

Toprol XL and Norvasc. Will start on Clonidine 0.1 mg p.o q12 hours and 

increase as necessary to achieve sbp < 140 and dbp < 90mmHg. Post Clonidine BP 

was 184/68.








(3) Chronic atrial fibrillation


Is this a current diagnosis for this admission?: YesPlan: 


Continue current anticoagulation therapy on Eliquis and rate control with 

Toprol XL








(4) Diabetes mellitus


Qualifiers: 


     Diabetes mellitus type: type 2     Diabetes mellitus complication status: 

with kidney complications     Diabetes mellitus complication detail: with 

chronic kidney disease     Diabetes mellitus long term insulin use: with long 

term use     Chronic kidney disease stage: stage 4 (severe)        Qualified 

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

; N18.1 - Chronic kidney disease, stage 1; Z79.4 - Long term (current) use of 

insulin  


Is this a current diagnosis for this admission?: YesPlan: 


Continue current mediation management.








(5) Nephrotic range proteinuria


Is this a current diagnosis for this admission?: YesPlan: 


Continue current medication management. Follow up on nephrology consultation 

request.











- Time


Time Spent with patient: 25-34 minutes


Medications reviewed and adjusted accordingly: Yes


Anticipated discharge: Home


Within: Other





- Inpatient Certification


Based on my medical assessment, after consideration of the patient's 

comorbidities, presenting symptoms, or acuity I expect that the services needed 

warrant INPATIENT care.: Yes


I certify that my determination is in accordance with my understanding of 

Medicare's requirements for reasonable and necessary INPATIENT services [42 CFR 

412.3e].: Yes


Medical Necessity: Need Close Monitoring Due to Risk of Patient Decompensation, 

Need For Continuous Telemetry Monitoring, Risk of Complication if Not Cared For 

in Hospital


Post Hospital Care: D/C Planner Documentation





- Plan Summary


Plan Summary: 


See covering attending orders.

## 2017-07-30 LAB
ANION GAP SERPL CALC-SCNC: 9 MMOL/L (ref 5–19)
BASOPHILS # BLD AUTO: 0.1 10^3/UL (ref 0–0.2)
BASOPHILS NFR BLD AUTO: 0.9 % (ref 0–2)
BUN SERPL-MCNC: 23 MG/DL (ref 7–20)
CALCIUM: 8.7 MG/DL (ref 8.4–10.2)
CHLORIDE SERPL-SCNC: 109 MMOL/L (ref 98–107)
CO2 SERPL-SCNC: 24 MMOL/L (ref 22–30)
CREAT SERPL-MCNC: 1.52 MG/DL (ref 0.52–1.25)
EOSINOPHIL # BLD AUTO: 0.2 10^3/UL (ref 0–0.6)
EOSINOPHIL NFR BLD AUTO: 2.6 % (ref 0–6)
ERYTHROCYTE [DISTWIDTH] IN BLOOD BY AUTOMATED COUNT: 17.1 % (ref 11.5–14)
GLUCOSE SERPL-MCNC: 133 MG/DL (ref 75–110)
HCT VFR BLD CALC: 25 % (ref 36–47)
HGB BLD-MCNC: 8.2 G/DL (ref 12–15.5)
HGB HCT DIFFERENCE: -0.4
LYMPHOCYTES # BLD AUTO: 1.8 10^3/UL (ref 0.5–4.7)
LYMPHOCYTES NFR BLD AUTO: 21.1 % (ref 13–45)
MAGNESIUM SERPL-MCNC: 1.7 MG/DL (ref 1.6–2.3)
MCH RBC QN AUTO: 27.8 PG (ref 27–33.4)
MCHC RBC AUTO-ENTMCNC: 32.9 G/DL (ref 32–36)
MCV RBC AUTO: 84 FL (ref 80–97)
MONOCYTES # BLD AUTO: 0.5 10^3/UL (ref 0.1–1.4)
MONOCYTES NFR BLD AUTO: 6.2 % (ref 3–13)
NEUTROPHILS # BLD AUTO: 5.9 10^3/UL (ref 1.7–8.2)
NEUTS SEG NFR BLD AUTO: 69.2 % (ref 42–78)
POTASSIUM SERPL-SCNC: 3.6 MMOL/L (ref 3.6–5)
RBC # BLD AUTO: 2.96 10^6/UL (ref 3.72–5.28)
SODIUM SERPL-SCNC: 141.5 MMOL/L (ref 137–145)
WBC # BLD AUTO: 8.5 10^3/UL (ref 4–10.5)

## 2017-07-30 RX ADMIN — AMLODIPINE BESYLATE SCH MG: 10 TABLET ORAL at 06:32

## 2017-07-30 RX ADMIN — INSULIN GLARGINE SCH UNIT: 100 INJECTION, SOLUTION SUBCUTANEOUS at 08:06

## 2017-07-30 RX ADMIN — SERTRALINE HYDROCHLORIDE SCH MG: 50 TABLET ORAL at 09:02

## 2017-07-30 RX ADMIN — VALSARTAN SCH MG: 160 TABLET ORAL at 21:11

## 2017-07-30 RX ADMIN — CLONIDINE HYDROCHLORIDE SCH MG: 0.2 TABLET ORAL at 21:11

## 2017-07-30 RX ADMIN — APIXABAN SCH MG: 2.5 TABLET, FILM COATED ORAL at 09:02

## 2017-07-30 RX ADMIN — ATORVASTATIN CALCIUM SCH MG: 20 TABLET, FILM COATED ORAL at 21:11

## 2017-07-30 RX ADMIN — SODIUM CHLORIDE PRN ML: 9 INJECTION, SOLUTION INTRAVENOUS at 06:30

## 2017-07-30 RX ADMIN — VALSARTAN SCH MG: 160 TABLET ORAL at 06:31

## 2017-07-30 RX ADMIN — CLONIDINE HYDROCHLORIDE PRN MG: 0.1 TABLET ORAL at 00:01

## 2017-07-30 RX ADMIN — APIXABAN SCH MG: 2.5 TABLET, FILM COATED ORAL at 18:38

## 2017-07-30 RX ADMIN — METOPROLOL SUCCINATE SCH MG: 50 TABLET, EXTENDED RELEASE ORAL at 06:32

## 2017-07-30 RX ADMIN — INSULIN LISPRO SCH UNIT: 100 INJECTION, SOLUTION INTRAVENOUS; SUBCUTANEOUS at 21:11

## 2017-07-30 RX ADMIN — LANSOPRAZOLE SCH MG: 30 TABLET, ORALLY DISINTEGRATING, DELAYED RELEASE ORAL at 09:02

## 2017-07-30 NOTE — PDOC PROGRESS REPORT
Subjective


Progress Note for:: 07/30/17


Subjective:: 


No chest pain or difficulty with breathing. Her severely elevated blood 

pressure remain a concern with need for intermittent usage of clonidine since 

last clinical evaluation. She denied any headache or dizziness. She reported 

persistently elevated blood pressure at home prior to admission. No chest pain 

or difficulty with her breathing.





Physical Exam


Vital Signs: 


 











Temp Pulse Resp BP Pulse Ox


 


 97.7 F   57 L  12   128/53 H  100 


 


 07/30/17 07:34  07/30/17 10:25  07/30/17 07:34  07/30/17 10:25  07/30/17 07:34








 Intake & Output











 07/29/17 07/30/17 07/31/17





 06:59 06:59 06:59


 


Intake Total 1540 3120 


 


Balance 1540 3120 


 


Weight 62.5 kg 63.5 kg 











Physical Exam: 


General appearance: PRESENT: no acute distress, cooperative


Head exam: PRESENT: atraumatic, normocephalic


Eye exam: PRESENT: conjunctiva pink, EOMI, PERRLA.  ABSENT: scleral icterus


Respiratory exam: PRESENT: clear to auscultation emilia


Cardiovascular exam: PRESENT: RRR, +S1, +S2


GI/Abdominal exam: PRESENT: normal bowel sounds, soft.  ABSENT: distended, 

guarding, mass, organomegaly, rebound, tenderness


Extremities exam: ABSENT: pedal edema


Musculoskeletal exam: PRESENT: normal inspection


Neurological exam: PRESENT: alert, CN II-XII grossly intact, motor sensory 

deficit with right hemiplegia.


Psychiatric exam: PRESENT: appropriate affect, normal mood.  ABSENT: homicidal 

ideation, suicidal ideation


Skin exam: PRESENT: dry, intact, warm.  ABSENT: cyanosis, rash





Results


Laboratory Results: 


 





 07/29/17 06:02 





 07/29/17 06:02 





 











  07/26/17 07/26/17 07/26/17





  13:27 13:27 13:27


 


Creatine Kinase  75  Cancelled 


 


CK-MB (CK-2)    0.27


 


Troponin I    0.018














  07/26/17 07/26/17 07/27/17





  21:40 21:52 03:50


 


Creatine Kinase   76 


 


CK-MB (CK-2)  0.25   0.33


 


Troponin I  0.022   0.022














  07/27/17





  03:50


 


Creatine Kinase  70


 


CK-MB (CK-2) 


 


Troponin I 











Impressions: 


 





Renal Ultrasound  07/27/17 00:00


IMPRESSION:  No hydronephrosis.  No evidence of hematoma.


 














Assessment & Plan





- Diagnosis


(1) Acute kidney injury


Is this a current diagnosis for this admission?: Yes








(3) Chronic atrial fibrillation


Is this a current diagnosis for this admission?: Yes





(4) Diabetes mellitus


Qualifiers: 


     Diabetes mellitus type: type 2     Diabetes mellitus complication status: 

with kidney complications     Diabetes mellitus complication detail: with 

chronic kidney disease     Diabetes mellitus long term insulin use: with long 

term use     Chronic kidney disease stage: stage 4 (severe)        Qualified 

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

; N18.1 - Chronic kidney disease, stage 1; Z79.4 - Long term (current) use of 

insulin  


Is this a current diagnosis for this admission?: Yes





(5) Nephrotic range proteinuria


Is this a current diagnosis for this admission?: Yes








- Time


Time Spent with patient: 25-34 minutes


Medications reviewed and adjusted accordingly: Yes


Anticipated discharge: Home with Homehealth


Within: Other





- Inpatient Certification


Based on my medical assessment, after consideration of the patient's 

comorbidities, presenting symptoms, or acuity I expect that the services needed 

warrant INPATIENT care.: Yes


I certify that my determination is in accordance with my understanding of 

Medicare's requirements for reasonable and necessary INPATIENT services [42 CFR 

412.3e].: Yes


Medical Necessity: Need Close Monitoring Due to Risk of Patient Decompensation, 

Need For IV Fluids, Need For Continuous Telemetry Monitoring, Need for IV 

Antibiotics, Risk of Complication if Not Cared For in Hospital


Post Hospital Care: D/C Planner Documentation





- Plan Summary


Plan Summary: 


Continue current anti HTN medication management. Start on Clonidine 0.2 mg po 

q12h hours and maintain on 0.1 mg po [id number] prn for sbp > 140mmHg. Obtain BMP, 

magnesium, and CBC. Heplock IV access.

## 2017-07-31 LAB
ALBUMIN 3: 3.6 G/DL (ref 2.9–4.4)
ALBUMIN SERPL-MCNC: 3.6 G/DL (ref 3.5–5)
ALBUMIN/GLOB SERPL: 1 {RATIO} (ref 0.7–1.7)
ALP SERPL-CCNC: 67 U/L (ref 38–126)
ALPHA-1-GLOBULIN 2: 0.2 G/DL (ref 0–0.4)
ALPHA-2-GLOBULIN 3: 1 G/DL (ref 0.4–1)
ALPHA1 GLOB SERPL ELPH-MCNC: 0.2 G/DL (ref 0–0.4)
ALT SERPL-CCNC: 17 U/L (ref 9–52)
ANION GAP SERPL CALC-SCNC: 12 MMOL/L (ref 5–19)
AST SERPL-CCNC: 15 U/L (ref 14–36)
B-GLOBULIN SERPL ELPH-MCNC: 1.4 G/DL (ref 0.7–1.3)
BILIRUB DIRECT SERPL-MCNC: 0.3 MG/DL (ref 0–0.4)
BILIRUB SERPL-MCNC: 0.3 MG/DL (ref 0.2–1.3)
BUN SERPL-MCNC: 29 MG/DL (ref 7–20)
CALCIUM: 9 MG/DL (ref 8.4–10.2)
CHLORIDE SERPL-SCNC: 105 MMOL/L (ref 98–107)
CO2 SERPL-SCNC: 24 MMOL/L (ref 22–30)
CREAT SERPL-MCNC: 1.71 MG/DL (ref 0.52–1.25)
GAMMA GLOB SERPL ELPH-MCNC: 0.9 G/DL (ref 0.4–1.8)
GLOBULIN SER-MCNC: 3.4 G/DL (ref 2.2–3.9)
GLUCOSE SERPL-MCNC: 144 MG/DL (ref 75–110)
IGA SERPL-MCNC: 344 MG/DL (ref 64–422)
IGG SERPL-MCNC: 876 MG/DL (ref 700–1600)
IGM SERPL-MCNC: 83 MG/DL (ref 26–217)
IGM SERPL-MCNC: 90 MG/DL (ref 26–217)
OTHER ANTIBIOTIC SUSC ISLT: 3 G/DL (ref 2.9–4.4)
POTASSIUM SERPL-SCNC: 3.4 MMOL/L (ref 3.6–5)
PROT SERPL-MCNC: 6.1 G/DL (ref 6–8.5)
PROT SERPL-MCNC: 6.8 G/DL (ref 6.3–8.2)
PROT SERPL-MCNC: 7 G/DL (ref 6–8.5)
SODIUM SERPL-SCNC: 140.7 MMOL/L (ref 137–145)

## 2017-07-31 RX ADMIN — APIXABAN SCH MG: 2.5 TABLET, FILM COATED ORAL at 09:31

## 2017-07-31 RX ADMIN — LANSOPRAZOLE SCH MG: 30 TABLET, ORALLY DISINTEGRATING, DELAYED RELEASE ORAL at 09:31

## 2017-07-31 RX ADMIN — APIXABAN SCH MG: 2.5 TABLET, FILM COATED ORAL at 17:30

## 2017-07-31 RX ADMIN — VALSARTAN SCH MG: 160 TABLET ORAL at 09:30

## 2017-07-31 RX ADMIN — INSULIN GLARGINE SCH UNIT: 100 INJECTION, SOLUTION SUBCUTANEOUS at 09:31

## 2017-07-31 RX ADMIN — CLONIDINE HYDROCHLORIDE SCH MG: 0.2 TABLET ORAL at 09:30

## 2017-07-31 RX ADMIN — CLONIDINE HYDROCHLORIDE SCH MG: 0.2 TABLET ORAL at 21:34

## 2017-07-31 RX ADMIN — SERTRALINE HYDROCHLORIDE SCH MG: 50 TABLET ORAL at 09:30

## 2017-07-31 RX ADMIN — AMLODIPINE BESYLATE SCH MG: 10 TABLET ORAL at 09:30

## 2017-07-31 RX ADMIN — ATORVASTATIN CALCIUM SCH MG: 20 TABLET, FILM COATED ORAL at 21:33

## 2017-07-31 RX ADMIN — VALSARTAN SCH MG: 160 TABLET ORAL at 21:33

## 2017-07-31 RX ADMIN — METOPROLOL SUCCINATE SCH MG: 50 TABLET, EXTENDED RELEASE ORAL at 09:31

## 2017-07-31 RX ADMIN — CLONIDINE HYDROCHLORIDE PRN MG: 0.1 TABLET ORAL at 04:41

## 2017-07-31 NOTE — PDOC PROGRESS REPORT
Subjective


Progress Note for:: 07/31/17


Subjective:: 


She was seen by the bedside she has diabetic nephropathy the protein 

electrophoresis was negative for M protein, she has nephrotic range proteinuria

, she has had severely elevated blood pressure over the weekend and she 

required multiple medication to achieve reasonable control of blood pressure.  

The blood pressure is elevated today   I discussed her case with family members 

the challenge for this patient is to prevent progression of nephropathy to end-

stage clearly she has chronic kidney disease which is progressed rapidly with 

nephrotic range proteinuria.  She would need to get the blood pressure below 138

/80 the A1c below 7 contro  diet and exercise





Physical Exam


Vital Signs: 


 











Temp Pulse Resp BP Pulse Ox


 


 98.4 F   66   16   165/62 H  100 


 


 07/31/17 16:00  07/31/17 19:00  07/31/17 16:00  07/31/17 16:00  07/31/17 16:00








 Intake & Output











 07/30/17 07/31/17 08/01/17





 06:59 06:59 06:59


 


Intake Total 3120 430 930


 


Balance 3120 430 930


 


Weight 63.5 kg 63.4 kg 











General appearance: PRESENT: no acute distress


Eye exam: PRESENT: PERRLA


Respiratory exam: PRESENT: clear to auscultation emilia


Cardiovascular exam: PRESENT: +S1, +S2


GI/Abdominal exam: PRESENT: soft


Neurological exam: PRESENT: alert





Results


Laboratory Results: 


 





 07/30/17 12:59 





 07/31/17 18:40 





 











  07/26/17 07/31/17





  13:27 18:40


 


Sodium   140.7


 


Potassium   3.4 L


 


Chloride   105


 


Carbon Dioxide   24


 


Anion Gap   12


 


BUN   29 H


 


Creatinine   1.71 H


 


Est GFR ( Amer)   35 L


 


Est GFR (Non-Af Amer)   29 L


 


Glucose   144 H


 


Calcium   9.0


 


Total Bilirubin   0.3


 


AST   15


 


ALT   17


 


Alkaline Phosphatase   67


 


Total Protein  7.0  6.8


 


Albumin  3.6  3.6








 





07/26/17 16:33   Blood   Blood Culture - Final


                            NO GROWTH IN 5 DAYS





 











  07/26/17 07/26/17 07/26/17





  13:27 13:27 13:27


 


Creatine Kinase  75  Cancelled 


 


CK-MB (CK-2)    0.27


 


Troponin I    0.018














  07/26/17 07/26/17 07/27/17





  21:40 21:52 03:50


 


Creatine Kinase   76 


 


CK-MB (CK-2)  0.25   0.33


 


Troponin I  0.022   0.022














  07/27/17





  03:50


 


Creatine Kinase  70


 


CK-MB (CK-2) 


 


Troponin I 











Impressions: 


 





Renal Ultrasound  07/27/17 00:00


IMPRESSION:  No hydronephrosis.  No evidence of hematoma.


 














Assessment & Plan





- Diagnosis


(1) Acute kidney injury


Is this a current diagnosis for this admission?: Yes





(2) Chronic atrial fibrillation


Is this a current diagnosis for this admission?: Yes





(3) Diabetes mellitus


Qualifiers: 


     Diabetes mellitus type: type 2     Diabetes mellitus complication status: 

with kidney complications     Diabetes mellitus complication detail: with 

chronic kidney disease     Diabetes mellitus long term insulin use: with long 

term use     Chronic kidney disease stage: stage 4 (severe)        Qualified 

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

; N18.1 - Chronic kidney disease, stage 1; Z79.4 - Long term (current) use of 

insulin  


Is this a current diagnosis for this admission?: Yes





(4) Nephrotic range proteinuria


Is this a current diagnosis for this admission?: Yes

## 2017-08-01 LAB
ANION GAP SERPL CALC-SCNC: 11 MMOL/L (ref 5–19)
BASOPHILS # BLD AUTO: 0.1 10^3/UL (ref 0–0.2)
BASOPHILS NFR BLD AUTO: 1.1 % (ref 0–2)
BUN SERPL-MCNC: 32 MG/DL (ref 7–20)
CALCIUM: 8.6 MG/DL (ref 8.4–10.2)
CHLORIDE SERPL-SCNC: 106 MMOL/L (ref 98–107)
CO2 SERPL-SCNC: 24 MMOL/L (ref 22–30)
CREAT SERPL-MCNC: 1.89 MG/DL (ref 0.52–1.25)
EOSINOPHIL # BLD AUTO: 0.1 10^3/UL (ref 0–0.6)
EOSINOPHIL NFR BLD AUTO: 1.2 % (ref 0–6)
ERYTHROCYTE [DISTWIDTH] IN BLOOD BY AUTOMATED COUNT: 17.7 % (ref 11.5–14)
FERRITIN SERPL-MCNC: 230 NG/ML (ref 11.1–264)
GLUCOSE SERPL-MCNC: 121 MG/DL (ref 75–110)
HCT VFR BLD CALC: 25.9 % (ref 36–47)
HGB BLD-MCNC: 8.7 G/DL (ref 12–15.5)
HGB HCT DIFFERENCE: 0.2
LYMPHOCYTES # BLD AUTO: 1.8 10^3/UL (ref 0.5–4.7)
LYMPHOCYTES NFR BLD AUTO: 16.2 % (ref 13–45)
MCH RBC QN AUTO: 27.5 PG (ref 27–33.4)
MCHC RBC AUTO-ENTMCNC: 33.5 G/DL (ref 32–36)
MCV RBC AUTO: 82 FL (ref 80–97)
MONOCYTES # BLD AUTO: 0.5 10^3/UL (ref 0.1–1.4)
MONOCYTES NFR BLD AUTO: 4.8 % (ref 3–13)
NEUTROPHILS # BLD AUTO: 8.3 10^3/UL (ref 1.7–8.2)
NEUTS SEG NFR BLD AUTO: 76.7 % (ref 42–78)
POTASSIUM SERPL-SCNC: 3.2 MMOL/L (ref 3.6–5)
RBC # BLD AUTO: 3.15 10^6/UL (ref 3.72–5.28)
SODIUM SERPL-SCNC: 141.1 MMOL/L (ref 137–145)
WBC # BLD AUTO: 10.8 10^3/UL (ref 4–10.5)

## 2017-08-01 RX ADMIN — APIXABAN SCH MG: 2.5 TABLET, FILM COATED ORAL at 10:20

## 2017-08-01 RX ADMIN — CLONIDINE HYDROCHLORIDE SCH MG: 0.2 TABLET ORAL at 21:45

## 2017-08-01 RX ADMIN — CLONIDINE HYDROCHLORIDE PRN MG: 0.1 TABLET ORAL at 16:54

## 2017-08-01 RX ADMIN — VALSARTAN SCH MG: 160 TABLET ORAL at 10:20

## 2017-08-01 RX ADMIN — LANSOPRAZOLE SCH MG: 30 TABLET, ORALLY DISINTEGRATING, DELAYED RELEASE ORAL at 10:18

## 2017-08-01 RX ADMIN — SERTRALINE HYDROCHLORIDE SCH MG: 50 TABLET ORAL at 10:20

## 2017-08-01 RX ADMIN — INSULIN GLARGINE SCH UNIT: 100 INJECTION, SOLUTION SUBCUTANEOUS at 10:16

## 2017-08-01 RX ADMIN — METOPROLOL SUCCINATE SCH MG: 50 TABLET, EXTENDED RELEASE ORAL at 10:18

## 2017-08-01 RX ADMIN — ATORVASTATIN CALCIUM SCH MG: 20 TABLET, FILM COATED ORAL at 21:45

## 2017-08-01 RX ADMIN — AMLODIPINE BESYLATE SCH MG: 10 TABLET ORAL at 10:21

## 2017-08-01 RX ADMIN — CLONIDINE HYDROCHLORIDE SCH MG: 0.2 TABLET ORAL at 10:18

## 2017-08-01 RX ADMIN — VALSARTAN SCH MG: 160 TABLET ORAL at 21:45

## 2017-08-01 RX ADMIN — APIXABAN SCH MG: 2.5 TABLET, FILM COATED ORAL at 17:34

## 2017-08-01 NOTE — PDOC CONSULTATION
Consultation


Consult Date: 07/31/17


Consult reason:: ABDULAZIZ/CKD





History of Present Illness


Admission Date/PCP: 


  07/26/17 12:35





  AVIVA POTTER MD





History of Present Illness: 


ASHLEY FUENTES is a 76 year old  female, with 

significant history of diabetes, hypertension, MI in 1994, stroke in 2007, 

chronic a-fib on blood thinner and CHF. She was admitted directly from home 

because of acute kidney injury.  After getting blood work for her primary care 

provider her serum creatinine was elevated to 2.31, previous baseline looks to 

have been around 1.5 to 1.6. She is producing urine and has no difficulties 

with urination. Complains of no diffuse muscle aches and has no s/s of a kidney 

stone. She has a history of diabetes mellitus with nephropathy. Diabetes has 

been well controlled as of late with an A1c of 6.4. Has had diabetes since the 

late 80s and has had period where it was out of control. Blood pressures have 

also been elevated and has had a poor history of control.  Family admits to 

their mother not hydrating well. 








Past Medical History


Cardiac Medical History: Reports: Atrial Fibrillation, Coronary Artery Disease, 

Hyperlipidemia, Hypertension-primary, Myocardial Infarction


Pulmonary Medical History: 


   Denies: Asthma, Bronchitis, Chronic Obstructive Pulmonary Disease (COPD), 

Pneumonia, Tuberculosis


Neurological Medical History: Reports: Ischemic CVA, Seizures - 10 years ago


Endocrine Medical History: Reports: Diabetes Mellitus Type 2


Renal/ Medical History: Reports: Chronic Kidney Disease Stage III, Proteinuria


Musculoskeltal Medical History: 


   Denies: Arthritis


Psychiatric Medical History: Reports: Depression


Hematology Medical History: Reports Anemia





Past Surgical History


Past Surgical History: Reports: Cardiac Catheterization - stent 2004


   Denies: Hysterectomy, Pacemaker





Social History


Smoking Status: Former Smoker


Frequency of Alcohol Use: None


Hx Recreational Drug Use: No


Hx Prescription Drug Abuse: No





Family History


Family History: DM, Hypertension


Parental Family History Reviewed: No


Children Family History Reviewed: Yes


Sibling(s) Family History Reviewed.: No





Medication/Allergy


Home Medications: 








Amlodipine Besylate [Norvasc 10 mg Tablet] 10 mg PO DAILY 07/26/17 


Apixaban [Eliquis 2.5 mg Tablet] 2.5 mg PO BID 07/26/17 


Ergocalciferol (Vitamin D2) [Drisdol 50,000 unit (1.25MG) Capsule] 50,000 unit 

PO WE@1000 07/26/17 


Insulin Aspart [Novolog Flexpen] 8 units SUBCUT QHS 07/26/17 


Insulin Glargine,Hum.rec.anlog [Lantus Insulin 100 Unit/mL] 15 units SUBCUT QAM 

07/26/17 


Linaclotide [Linzess] 290 mcg PO DAILY 07/26/17 


Metoprolol Succinate [Toprol  mg Tablet] 200 mg PO DAILY 07/26/17 


Omeprazole 40 mg PO DAILY 07/26/17 


Pioglitazone HCl [Actos] 30 mg PO DAILY 07/26/17 


Pravastatin Sodium [Pravachol] 80 mg PO DAILY 07/26/17 


Sertraline HCl [Zoloft] 100 mg PO DAILY 07/26/17 


Tramadol HCl/Acetaminophen [Ultracet 37.5 mg/325 mg Tablet] 1 tab PO Q6HP PRN 07 /26/17 


Valsartan/Hydrochlorothiazide [Diovan Hct 320-25 mg Tablet] 1 tab PO DAILY 07/26 /17 








Allergies/Adverse Reactions: 


 





No Known Allergies Allergy (Unverified 12/02/11 18:04)


 











Review of Systems


Constitutional: PRESENT: weakness.  ABSENT: chills, fever(s)


Cardiovascular: PRESENT: dyspnea on exertion.  ABSENT: chest pain, edema, 

orthropnea


Respiratory: PRESENT: cough, dyspnea.  ABSENT: hemoptysis


Gastrointestinal: ABSENT: abdominal pain, constipation, diarrhea, hematemesis, 

hematochezia, nausea, vomiting


Genitourinary: PRESENT: nocturia.  ABSENT: dysuria, hematuria


Neurological: PRESENT: weakness.  ABSENT: confusion, dizziness


Psychiatric: PRESENT: depression


Hematologic/Lymphatic: PRESENT: easy bleeding, easy bruising





Physical Exam


Vital Signs: 


 











Temp Pulse Resp BP Pulse Ox


 


 97.7 F   62   16   124/63   97 


 


 07/31/17 11:12  07/31/17 11:12  07/31/17 11:12  07/31/17 11:12  07/31/17 11:12








 Intake & Output











 07/30/17 07/31/17 08/01/17





 06:59 06:59 06:59


 


Intake Total 3120 430 


 


Balance 3120 430 


 


Weight 63.5 kg 63.4 kg 











General appearance: PRESENT: no acute distress, well-developed, well-nourished


Head exam: PRESENT: atraumatic


Eye exam: ABSENT: scleral icterus


Mouth exam: PRESENT: neck supple.  ABSENT: dry mucosa


Neck exam: PRESENT: full ROM.  ABSENT: carotid bruit, JVD, lymphadenopathy, 

thyromegaly


Respiratory exam: PRESENT: clear to auscultation emilia.  ABSENT: accessory muscle 

use, chest wall tenderness, crackles, decreased breath sounds, rales, rhonchi, 

stridor, tachypnea, wheezes


Cardiovascular exam: PRESENT: irregular rhythm, +S1, +S2.  ABSENT: tachycardia


Vascular exam: PRESENT: normal capillary refill


GI/Abdominal exam: PRESENT: normal bowel sounds, soft.  ABSENT: ascites, 

diminished bowel sounds, distended, firm, guarding, hernia, mass, rebound, 

tenderness


Extremities exam: ABSENT: joint swelling, pedal edema


Musculoskeletal exam: PRESENT: normal inspection





Results


Laboratory Results: 


 





 07/30/17 12:59 





 07/30/17 12:59 





 











  07/26/17





  13:27


 


Total Protein  7.0


 


Albumin  3.6








 





07/26/17 15:55   Blood   Blood Culture - Final


                            Staphylococcus Simulans





 











  07/26/17 07/26/17 07/26/17





  13:27 13:27 13:27


 


Creatine Kinase  75  Cancelled 


 


CK-MB (CK-2)    0.27


 


Troponin I    0.018














  07/26/17 07/26/17 07/27/17





  21:40 21:52 03:50


 


Creatine Kinase   76 


 


CK-MB (CK-2)  0.25   0.33


 


Troponin I  0.022   0.022














  07/27/17





  03:50


 


Creatine Kinase  70


 


CK-MB (CK-2) 


 


Troponin I 











Impressions: 


 





Renal Ultrasound  07/27/17 00:00


IMPRESSION:  No hydronephrosis.  No evidence of hematoma.


 














Assessment & Plan





- Diagnosis


(1) Acute kidney injury


Is this a current diagnosis for this admission?: YesPlan: 





looks to have resolved, back down to baseline. Most likely underlining cause of 

creatinine elevation was prerenal. 








(2) Chronic atrial fibrillation


Is this a current diagnosis for this admission?: Yes





(3) Diabetes mellitus


Qualifiers: 


     Diabetes mellitus type: type 2     Diabetes mellitus complication status: 

with kidney complications     Diabetes mellitus complication detail: with 

chronic kidney disease     Diabetes mellitus long term insulin use: with long 

term use     Chronic kidney disease stage: stage 4 (severe)        Qualified 

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

; N18.1 - Chronic kidney disease, stage 1; Z79.4 - Long term (current) use of 

insulin  


Is this a current diagnosis for this admission?: Yes





(4) Hypertension, uncontrolled


Plan: 


continue on current medications








(5) Nephrotic range proteinuria


Is this a current diagnosis for this admission?: YesPlan: 


continue on diovan to prevent worsening of proteinuria. Most likely etiology is 

from her diabetes that the family stated that she has had since the late 80s.








(6) Chronic kidney disease (CKD)


Qualifiers: 


     Chronic kidney disease stage: stage 3 (moderate)        Qualified Code(s): 

N18.3 - Chronic kidney disease, stage 3 (moderate)  


Plan: 


looks to be back at baseline. Will follow up with her in clinic once she is out 

of hospital.

## 2017-08-01 NOTE — PDOC PROGRESS REPORT
Subjective


Progress Note for:: 08/01/17


Subjective:: 


Patient was doing well laying in bed. She had no major complaints. At the


 time she was resting and her family wasn't not present. Patient denied any s/s 

of anemia at the time.





Physical Exam


Vital Signs: 


 











Temp Pulse Resp BP Pulse Ox


 


 97.5 F   68   14   193/79 H  100 


 


 08/01/17 07:42  08/01/17 14:00  08/01/17 07:42  08/01/17 07:42  08/01/17 07:42








 Intake & Output











 07/31/17 08/01/17 08/02/17





 06:59 06:59 06:59


 


Intake Total 430 1670 810


 


Balance 430 1670 810


 


Weight 63.4 kg 68 kg 











General appearance: PRESENT: no acute distress, well-developed, well-nourished


Head exam: PRESENT: atraumatic


Mouth exam: PRESENT: moist, neck supple


Neck exam: PRESENT: full ROM.  ABSENT: JVD


Respiratory exam: PRESENT: clear to auscultation emilia.  ABSENT: accessory muscle 

use, chest wall tenderness, crackles, rales, rhonchi, tachypnea, wheezes


Cardiovascular exam: PRESENT: irregular rhythm, +S1, +S2.  ABSENT: tachycardia


GI/Abdominal exam: PRESENT: normal bowel sounds, soft.  ABSENT: ascites, 

diminished bowel sounds, distended, firm, guarding, hernia, mass, rebound, 

tenderness


Extremities exam: ABSENT: joint swelling, pedal edema


Musculoskeletal exam: PRESENT: normal inspection.  ABSENT: deformity


Neurological exam: PRESENT: alert, awake, oriented to person, oriented to place

, oriented to time


Psychiatric exam: PRESENT: normal mood.  ABSENT: unusual affect





Results


Laboratory Results: 


 





 08/01/17 04:15 





 07/31/17 18:40 





 











  07/26/17 07/31/17 08/01/17





  21:40 18:40 04:15


 


WBC   


 


RBC   


 


Hgb   


 


Hct   


 


MCV   


 


MCH   


 


MCHC   


 


RDW   


 


Plt Count   


 


Seg Neutrophils %   


 


Lymphocytes %   


 


Monocytes %   


 


Eosinophils %   


 


Basophils %   


 


Absolute Neutrophils   


 


Absolute Lymphocytes   


 


Absolute Monocytes   


 


Absolute Eosinophils   


 


Absolute Basophils   


 


Sodium   140.7 


 


Potassium   3.4 L 


 


Chloride   105 


 


Carbon Dioxide   24 


 


Anion Gap   12 


 


BUN   29 H 


 


Creatinine   1.71 H 


 


Est GFR ( Amer)   35 L 


 


Est GFR (Non-Af Amer)   29 L 


 


Glucose   144 H 


 


Calcium   9.0 


 


Iron    34.2 L


 


TIBC    205 L


 


% Saturation    17


 


Ferritin    230.00


 


Total Bilirubin   0.3 


 


AST   15 


 


ALT   17 


 


Alkaline Phosphatase   67 


 


Total Protein  6.1  6.8 


 


Albumin  3.0  3.6 














  08/01/17





  04:15


 


WBC  10.8 H


 


RBC  3.15 L


 


Hgb  8.7 L


 


Hct  25.9 L


 


MCV  82


 


MCH  27.5


 


MCHC  33.5


 


RDW  17.7 H


 


Plt Count  266


 


Seg Neutrophils %  76.7


 


Lymphocytes %  16.2


 


Monocytes %  4.8


 


Eosinophils %  1.2


 


Basophils %  1.1


 


Absolute Neutrophils  8.3 H


 


Absolute Lymphocytes  1.8


 


Absolute Monocytes  0.5


 


Absolute Eosinophils  0.1


 


Absolute Basophils  0.1


 


Sodium 


 


Potassium 


 


Chloride 


 


Carbon Dioxide 


 


Anion Gap 


 


BUN 


 


Creatinine 


 


Est GFR ( Amer) 


 


Est GFR (Non-Af Amer) 


 


Glucose 


 


Calcium 


 


Iron 


 


TIBC 


 


% Saturation 


 


Ferritin 


 


Total Bilirubin 


 


AST 


 


ALT 


 


Alkaline Phosphatase 


 


Total Protein 


 


Albumin 








 





07/26/17 16:33   Blood   Blood Culture - Final


                            NO GROWTH IN 5 DAYS





 











  07/26/17 07/26/17 07/26/17





  13:27 13:27 13:27


 


Creatine Kinase  75  Cancelled 


 


CK-MB (CK-2)    0.27


 


Troponin I    0.018














  07/26/17 07/26/17 07/27/17





  21:40 21:52 03:50


 


Creatine Kinase   76 


 


CK-MB (CK-2)  0.25   0.33


 


Troponin I  0.022   0.022














  07/27/17





  03:50


 


Creatine Kinase  70


 


CK-MB (CK-2) 


 


Troponin I 











Impressions: 


 





Renal Ultrasound  07/27/17 00:00


IMPRESSION:  No hydronephrosis.  No evidence of hematoma.


 














Assessment & Plan





- Diagnosis


(1) Acute kidney injury


Is this a current diagnosis for this admission?: YesPlan: 











looks to have resolved, back down to baseline. Encouraged the patient and her 

family to give her around 32oz of water.








(2) Chronic atrial fibrillation


Is this a current diagnosis for this admission?: Yes





(3) Diabetes mellitus


Qualifiers: 


     Diabetes mellitus type: type 2     Diabetes mellitus complication status: 

with kidney complications     Diabetes mellitus complication detail: with 

chronic kidney disease     Diabetes mellitus long term insulin use: with long 

term use     Chronic kidney disease stage: stage 4 (severe)        Qualified 

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

; N18.1 - Chronic kidney disease, stage 1; Z79.4 - Long term (current) use of 

insulin  


Is this a current diagnosis for this admission?: Yes





(4) Hypertension, uncontrolled


Plan: 





Recommend close home monitoring and adjustments as outpatient








(5) Nephrotic range proteinuria


Is this a current diagnosis for this admission?: YesPlan: 


continue on diovan to prevent worsening of proteinuria. Most likely etiology is 

from her diabetes that the family stated that she has had since the late 80s.








(6) Chronic kidney disease (CKD)


Qualifiers: 


     Chronic kidney disease stage: stage 3 (moderate)        Qualified Code(s): 

N18.3 - Chronic kidney disease, stage 3 (moderate)  


Plan: 


looks to be back at baseline. Will follow up with her in clinic once she is out 

of hospital.

## 2017-08-01 NOTE — PDOC PROGRESS REPORT
Subjective


Progress Note for:: 08/01/17


Subjective:: 


Patient was seen by the bedside, she was seen by the nephrologist, the kidney 

function is improved some ,it seems  that she is back to her baseline, she has 

nephrotic range proteinuria with associated chronic kidney disease from 

nephropathy, the blood pressure has been very high, she requires multiple 

medication to achieve reasonable control of the blood pressure, the elevated 

blood pressure could also be related to the chronic kidney disease.  





Physical Exam


Vital Signs: 


 











Temp Pulse Resp BP Pulse Ox


 


 98.1 F   68   18   134/57 H  99 


 


 08/01/17 19:43  08/01/17 19:43  08/01/17 19:43  08/01/17 19:43  08/01/17 19:43








 Intake & Output











 07/31/17 08/01/17 08/02/17





 06:59 06:59 06:59


 


Intake Total 430 1670 815


 


Balance 430 1670 815


 


Weight 63.4 kg 68 kg 











General appearance: PRESENT: no acute distress


Eye exam: PRESENT: PERRLA


Respiratory exam: PRESENT: clear to auscultation emilia


Cardiovascular exam: PRESENT: +S1, +S2


GI/Abdominal exam: PRESENT: soft


Neurological exam: PRESENT: alert





Results


Laboratory Results: 


 





 08/01/17 04:15 





 07/31/17 18:40 





 











  07/26/17 08/01/17 08/01/17





  21:40 04:15 04:15


 


WBC    10.8 H


 


RBC    3.15 L


 


Hgb    8.7 L


 


Hct    25.9 L


 


MCV    82


 


MCH    27.5


 


MCHC    33.5


 


RDW    17.7 H


 


Plt Count    266


 


Seg Neutrophils %    76.7


 


Lymphocytes %    16.2


 


Monocytes %    4.8


 


Eosinophils %    1.2


 


Basophils %    1.1


 


Absolute Neutrophils    8.3 H


 


Absolute Lymphocytes    1.8


 


Absolute Monocytes    0.5


 


Absolute Eosinophils    0.1


 


Absolute Basophils    0.1


 


Iron   34.2 L 


 


TIBC   205 L 


 


% Saturation   17 


 


Ferritin   230.00 


 


Total Protein  6.1  


 


Albumin  3.0  








 











  07/26/17 07/26/17 07/26/17





  13:27 13:27 13:27


 


Creatine Kinase  75  Cancelled 


 


CK-MB (CK-2)    0.27


 


Troponin I    0.018














  07/26/17 07/26/17 07/27/17





  21:40 21:52 03:50


 


Creatine Kinase   76 


 


CK-MB (CK-2)  0.25   0.33


 


Troponin I  0.022   0.022














  07/27/17





  03:50


 


Creatine Kinase  70


 


CK-MB (CK-2) 


 


Troponin I 











Impressions: 


 





Renal Ultrasound  07/27/17 00:00


IMPRESSION:  No hydronephrosis.  No evidence of hematoma.


 














Assessment & Plan





- Diagnosis


(1) Acute kidney injury


Is this a current diagnosis for this admission?: Yes





(2) Chronic atrial fibrillation


Is this a current diagnosis for this admission?: Yes





(3) Diabetes mellitus


Qualifiers: 


     Diabetes mellitus type: type 2     Diabetes mellitus complication status: 

with kidney complications     Diabetes mellitus complication detail: with 

chronic kidney disease     Diabetes mellitus long term insulin use: with long 

term use     Chronic kidney disease stage: stage 4 (severe)        Qualified 

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

; N18.1 - Chronic kidney disease, stage 1; Z79.4 - Long term (current) use of 

insulin  


Is this a current diagnosis for this admission?: Yes





(4) Nephrotic range proteinuria


Is this a current diagnosis for this admission?: Yes

## 2017-08-02 VITALS — DIASTOLIC BLOOD PRESSURE: 70 MMHG | SYSTOLIC BLOOD PRESSURE: 180 MMHG

## 2017-08-02 LAB
ALBUMIN SERPL-MCNC: 3.1 G/DL (ref 3.5–5)
ALP SERPL-CCNC: 58 U/L (ref 38–126)
ALT SERPL-CCNC: 23 U/L (ref 9–52)
ANION GAP SERPL CALC-SCNC: 12 MMOL/L (ref 5–19)
AST SERPL-CCNC: 17 U/L (ref 14–36)
BASOPHILS # BLD AUTO: 0.1 10^3/UL (ref 0–0.2)
BASOPHILS NFR BLD AUTO: 0.6 % (ref 0–2)
BILIRUB DIRECT SERPL-MCNC: 0.3 MG/DL (ref 0–0.4)
BILIRUB SERPL-MCNC: 0.3 MG/DL (ref 0.2–1.3)
BUN SERPL-MCNC: 34 MG/DL (ref 7–20)
CALCIUM: 8.7 MG/DL (ref 8.4–10.2)
CHLORIDE SERPL-SCNC: 105 MMOL/L (ref 98–107)
CO2 SERPL-SCNC: 23 MMOL/L (ref 22–30)
CREAT SERPL-MCNC: 1.67 MG/DL (ref 0.52–1.25)
EOSINOPHIL # BLD AUTO: 0.2 10^3/UL (ref 0–0.6)
EOSINOPHIL NFR BLD AUTO: 1.6 % (ref 0–6)
ERYTHROCYTE [DISTWIDTH] IN BLOOD BY AUTOMATED COUNT: 17.7 % (ref 11.5–14)
GLUCOSE SERPL-MCNC: 180 MG/DL (ref 75–110)
HCT VFR BLD CALC: 23.7 % (ref 36–47)
HGB BLD-MCNC: 8 G/DL (ref 12–15.5)
HGB HCT DIFFERENCE: 0.3
LYMPHOCYTES # BLD AUTO: 1.5 10^3/UL (ref 0.5–4.7)
LYMPHOCYTES NFR BLD AUTO: 15.3 % (ref 13–45)
MCH RBC QN AUTO: 27.7 PG (ref 27–33.4)
MCHC RBC AUTO-ENTMCNC: 33.9 G/DL (ref 32–36)
MCV RBC AUTO: 82 FL (ref 80–97)
MONOCYTES # BLD AUTO: 0.5 10^3/UL (ref 0.1–1.4)
MONOCYTES NFR BLD AUTO: 5.3 % (ref 3–13)
NEUTROPHILS # BLD AUTO: 7.5 10^3/UL (ref 1.7–8.2)
NEUTS SEG NFR BLD AUTO: 77.2 % (ref 42–78)
POTASSIUM SERPL-SCNC: 3.5 MMOL/L (ref 3.6–5)
PROT SERPL-MCNC: 5.9 G/DL (ref 6.3–8.2)
RBC # BLD AUTO: 2.91 10^6/UL (ref 3.72–5.28)
SODIUM SERPL-SCNC: 140.4 MMOL/L (ref 137–145)
WBC # BLD AUTO: 9.8 10^3/UL (ref 4–10.5)

## 2017-08-02 RX ADMIN — SERTRALINE HYDROCHLORIDE SCH MG: 50 TABLET ORAL at 09:05

## 2017-08-02 RX ADMIN — AMLODIPINE BESYLATE SCH MG: 10 TABLET ORAL at 09:05

## 2017-08-02 RX ADMIN — METOPROLOL SUCCINATE SCH MG: 50 TABLET, EXTENDED RELEASE ORAL at 09:04

## 2017-08-02 RX ADMIN — CLONIDINE HYDROCHLORIDE SCH MG: 0.2 TABLET ORAL at 09:05

## 2017-08-02 RX ADMIN — INSULIN GLARGINE SCH UNIT: 100 INJECTION, SOLUTION SUBCUTANEOUS at 09:06

## 2017-08-02 RX ADMIN — APIXABAN SCH MG: 2.5 TABLET, FILM COATED ORAL at 09:06

## 2017-08-02 RX ADMIN — VALSARTAN SCH MG: 160 TABLET ORAL at 09:05

## 2017-08-02 RX ADMIN — LANSOPRAZOLE SCH MG: 30 TABLET, ORALLY DISINTEGRATING, DELAYED RELEASE ORAL at 09:04

## 2017-08-02 NOTE — PDOC DISCHARGE SUMMARY
General





- Admit/Disc Date/PCP


Admission Date/Primary Care Provider: 


  07/26/17 12:35





  AVIVA POTTER MD





Discharge Date: 08/02/17





- Discharge Diagnosis


(1) Acute kidney injury


Is this a current diagnosis for this admission?: Yes





(2) Chronic atrial fibrillation


Is this a current diagnosis for this admission?: Yes





(3) Diabetes mellitus


Is this a current diagnosis for this admission?: Yes





(4) Nephrotic range proteinuria


Is this a current diagnosis for this admission?: Yes





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 

diabetes mellitus


Is this a current diagnosis for this admission?: Yes








- Additional Information


Home Medications: 








Tramadol HCl/Acetaminophen [Ultracet 37.5 mg/325 mg Tablet] 1 tab PO Q6HP PRN 07 /26/17 


Amlodipine Besylate [Norvasc 10 mg Tablet] 10 mg PO DAILY #90 tablet 08/02/17 


Apixaban [Eliquis 2.5 mg Tablet] 2.5 mg PO BID #180 tablet 08/02/17 


Clonidine HCl [Catapres 0.1 mg Tablet] 0.2 mg PO Q8HP PRN #90 tablet 08/02/17 


Ergocalciferol (Vitamin D2) [Drisdol 50,000 unit (1.25MG) Capsule] 50,000 unit 

PO WE@1000 #12 capsule 08/02/17 


Ferrous Sulfate [Feosol 325 mg Tablet] 325 mg PO DAILY #90 tablet 08/02/17 


Insulin Aspart [Novolog Flexpen] 8 units SUBCUT QHS #5 ml 08/02/17 


Insulin Glargine,Hum.rec.anlog [Lantus Insulin 100 Unit/mL] 15 units SUBCUT QAM 

#3 insuln.pen 08/02/17 


Linaclotide [Linzess] 290 mcg PO DAILY #90 capsule 08/02/17 


Metoprolol Succinate [Toprol  mg Tablet] 200 mg PO DAILY #90 tab.sr.24h 08 /02/17 


Omeprazole 40 mg PO DAILY #90 capsule. 08/02/17 


Pioglitazone HCl [Actos] 30 mg PO DAILY #90 tablet 08/02/17 


Pravastatin Sodium [Pravachol] 80 mg PO DAILY #90 tablet 08/02/17 


Sertraline HCl [Zoloft] 100 mg PO DAILY #90 tablet 08/02/17 


Valsartan/Hydrochlorothiazide [Diovan Hct 320-25 mg Tablet] 1 tab PO DAILY #90 

tablet 08/02/17 











History of Present Illness


History of Present Illness: 


ASHLEY FUENTES is a 76 year old female, She was admitted directly from 

home because of acute kidney injury.  She had blood work yesterday, the serum 

creatinine was 2.31, the serum creatinine from 6/27/2017 was 2.01, the serum 

creatinine from 03/20/27 was 1.64 there was progressive decline in the 

estimated GFR.  The estimated GFR from 03/20/2017 was 33,, the estimated GFR 

from 06/27/2017 was 26, the estimated GFR from 07/25/2017 was 22 because of the 

rapid decline in the kidney function she was admitted directly to evaluate out 

for acute kidney injury to rule out any prerenal component or any other 

condition that could be contributing to the rapid decline kidney function.  She 

has a history of diabetes mellitus with nephropathy.  She has other comorbid 

conditions including CVA, chronic atrial fibrillation.








Hospital Course


Hospital Course: 


Patient was admitted for the management of acute kidney injury, she has 

background chronic kidney disease .There was worsening serum creatinine she was 

admitted for evaluation of possible etiology of acute kidney injury.  24-hour 

urine protein, urine protein creatinine ratio was 10, this correlates with 10 g 

24 urine protein.  She was also evaluated for possible multiple myeloma, the 

evaluation was negative for multiple myeloma there was no M spike on 

electrophoresis of the urine and the serum.She had severely elevated blood 

pressure requiring multiple medication to achieve a reasonable control of the 

blood pressure.She was treated with intravenous fluids with improvement of the 

serum creatinine this suggest that the elevated serum creatinine is not all 

from chronic kidney disease, there is a prerenal component as well.She was also 

seen by nephrologist on this admission.  There was no indication for acute 

renal replacement therapy.





Physical Exam


Vital Signs: 


 











Temp Pulse Resp BP Pulse Ox


 


 97.6 F   64   18   180/70 H  99 


 


 08/02/17 15:54  08/02/17 15:54  08/02/17 15:54  08/02/17 15:54  08/02/17 15:54








 Intake & Output











 08/01/17 08/02/17 08/03/17





 06:59 06:59 06:59


 


Intake Total 1670 1055 


 


Balance 1670 1055 


 


Weight 68 kg 67.7 kg 











General appearance: PRESENT: no acute distress


Eye exam: PRESENT: PERRLA


Respiratory exam: PRESENT: clear to auscultation emilia


Cardiovascular exam: PRESENT: +S1, +S2


GI/Abdominal exam: PRESENT: soft


Neurological exam: PRESENT: alert





Results


Laboratory Results: 


 





 08/02/17 14:40 





 08/02/17 14:40 





 











  08/01/17 08/02/17 08/02/17





  22:10 14:40 14:40


 


WBC   9.8 


 


RBC   2.91 L 


 


Hgb   8.0 L 


 


Hct   23.7 L 


 


MCV   82 


 


MCH   27.7 


 


MCHC   33.9 


 


RDW   17.7 H 


 


Plt Count   251 


 


Seg Neutrophils %   77.2 


 


Lymphocytes %   15.3 


 


Monocytes %   5.3 


 


Eosinophils %   1.6 


 


Basophils %   0.6 


 


Absolute Neutrophils   7.5 


 


Absolute Lymphocytes   1.5 


 


Absolute Monocytes   0.5 


 


Absolute Eosinophils   0.2 


 


Absolute Basophils   0.1 


 


Sodium  141.1   140.4


 


Potassium  3.2 L   3.5 L


 


Chloride  106   105


 


Carbon Dioxide  24   23


 


Anion Gap  11   12


 


BUN  32 H   34 H


 


Creatinine  1.89 H   1.67 H


 


Est GFR ( Amer)  31 L   36 L


 


Est GFR (Non-Af Amer)  26 L   30 L


 


Glucose  121 H   180 H


 


Calcium  8.6   8.7


 


Total Bilirubin    0.3


 


AST    17


 


ALT    23


 


Alkaline Phosphatase    58


 


Total Protein    5.9 L


 


Albumin    3.1 L








 











  07/26/17 07/26/17 07/26/17





  13:27 13:27 13:27


 


Creatine Kinase  75  Cancelled 


 


CK-MB (CK-2)    0.27


 


Troponin I    0.018














  07/26/17 07/26/17 07/27/17





  21:40 21:52 03:50


 


Creatine Kinase   76 


 


CK-MB (CK-2)  0.25   0.33


 


Troponin I  0.022   0.022














  07/27/17





  03:50


 


Creatine Kinase  70


 


CK-MB (CK-2) 


 


Troponin I 











Impressions: 


 





Renal Ultrasound  07/27/17 00:00


IMPRESSION:  No hydronephrosis.  No evidence of hematoma.

## 2017-08-02 NOTE — PDOC PROGRESS REPORT
Subjective


Progress Note for:: 08/02/17


Subjective:: 


Patient was laying comfortably in her bed eating her lunch. 


Has no major complaints. Tolerates the PO iron well. No stomach irritation from 

it.





Physical Exam


Vital Signs: 


 











Temp Pulse Resp BP Pulse Ox


 


 97.6 F   64   18   180/70 H  99 


 


 08/02/17 16:27  08/02/17 16:27  08/02/17 16:27  08/02/17 16:27  08/02/17 16:27








 Intake & Output











 08/01/17 08/02/17 08/03/17





 06:59 06:59 06:59


 


Intake Total 1670 1055 


 


Balance 1670 1055 


 


Weight 68 kg 67.7 kg 











General appearance: PRESENT: no acute distress, well-developed, well-nourished


Head exam: PRESENT: atraumatic, normocephalic


Mouth exam: PRESENT: moist


Neck exam: PRESENT: full ROM.  ABSENT: JVD, tenderness


Respiratory exam: PRESENT: clear to auscultation emilia.  ABSENT: accessory muscle 

use, chest wall tenderness, crackles, rales, rhonchi, tachypnea


Cardiovascular exam: PRESENT: irregular rhythm, +S1, +S2.  ABSENT: tachycardia


GI/Abdominal exam: PRESENT: normal bowel sounds, soft.  ABSENT: ascites, 

diminished bowel sounds, distended, firm, guarding, hernia, mass, rebound, 

tenderness


Extremities exam: PRESENT: full ROM.  ABSENT: calf tenderness, clubbing, pedal 

edema


Neurological exam: PRESENT: alert, awake, oriented to person, oriented to place

, oriented to time, oriented to situation.  ABSENT: motor sensory deficit


Psychiatric exam: PRESENT: appropriate affect, normal mood


Skin exam: PRESENT: dry, intact, warm.  ABSENT: cyanosis





Results


Laboratory Results: 


 





 08/02/17 14:40 





 08/02/17 14:40 





 











  08/01/17 08/02/17 08/02/17





  22:10 14:40 14:40


 


WBC   9.8 


 


RBC   2.91 L 


 


Hgb   8.0 L 


 


Hct   23.7 L 


 


MCV   82 


 


MCH   27.7 


 


MCHC   33.9 


 


RDW   17.7 H 


 


Plt Count   251 


 


Seg Neutrophils %   77.2 


 


Lymphocytes %   15.3 


 


Monocytes %   5.3 


 


Eosinophils %   1.6 


 


Basophils %   0.6 


 


Absolute Neutrophils   7.5 


 


Absolute Lymphocytes   1.5 


 


Absolute Monocytes   0.5 


 


Absolute Eosinophils   0.2 


 


Absolute Basophils   0.1 


 


Sodium  141.1   140.4


 


Potassium  3.2 L   3.5 L


 


Chloride  106   105


 


Carbon Dioxide  24   23


 


Anion Gap  11   12


 


BUN  32 H   34 H


 


Creatinine  1.89 H   1.67 H


 


Est GFR ( Amer)  31 L   36 L


 


Est GFR (Non-Af Amer)  26 L   30 L


 


Glucose  121 H   180 H


 


Calcium  8.6   8.7


 


Total Bilirubin    0.3


 


AST    17


 


ALT    23


 


Alkaline Phosphatase    58


 


Total Protein    5.9 L


 


Albumin    3.1 L








 











  07/26/17 07/26/17 07/26/17





  13:27 13:27 13:27


 


Creatine Kinase  75  Cancelled 


 


CK-MB (CK-2)    0.27


 


Troponin I    0.018














  07/26/17 07/26/17 07/27/17





  21:40 21:52 03:50


 


Creatine Kinase   76 


 


CK-MB (CK-2)  0.25   0.33


 


Troponin I  0.022   0.022














  07/27/17





  03:50


 


Creatine Kinase  70


 


CK-MB (CK-2) 


 


Troponin I 











Impressions: 


 





Renal Ultrasound  07/27/17 00:00


IMPRESSION:  No hydronephrosis.  No evidence of hematoma.


 














Assessment & Plan





- Diagnosis


(1) Acute kidney injury


Is this a current diagnosis for this admission?: YesPlan: 














looks to have resolved, back down to baseline.








(2) Chronic atrial fibrillation


Is this a current diagnosis for this admission?: Yes





(3) Diabetes mellitus


Qualifiers: 


     Diabetes mellitus type: type 2     Diabetes mellitus complication status: 

with kidney complications     Diabetes mellitus complication detail: with 

chronic kidney disease     Diabetes mellitus long term insulin use: with long 

term use     Chronic kidney disease stage: stage 4 (severe)        Qualified 

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

; N18.1 - Chronic kidney disease, stage 1; Z79.4 - Long term (current) use of 

insulin  


Is this a current diagnosis for this admission?: Yes





(4) Hypertension, uncontrolled


Plan: 





Recommend close home monitoring and adjustments as outpatient








(5) Nephrotic range proteinuria


Is this a current diagnosis for this admission?: YesPlan: 


continue on diovan to prevent worsening of proteinuria. Most likely etiology is 

from her diabetes that the family stated that she has had since the late 80s.








(6) Chronic kidney disease (CKD)


Qualifiers: 


     Chronic kidney disease stage: stage 3 (moderate)        Qualified Code(s): 

N18.3 - Chronic kidney disease, stage 3 (moderate)  


Plan: 


looks to be back at baseline. Will follow up with her in clinic once she is out 

of hospital.








(7) Hypokalemia


Plan: 


discussed with the family increasing her potassium intake by one more unit a 

day. Explained several potassium rich foods. Will f/u with as outpatient.








(8) Iron deficiency anemia





Plan: 


started her on PO iron qd and is tolerating it well.

## 2017-08-18 ENCOUNTER — HOSPITAL ENCOUNTER (OUTPATIENT)
Dept: HOSPITAL 62 - OD | Age: 77
End: 2017-08-18
Attending: INTERNAL MEDICINE
Payer: MEDICARE

## 2017-08-18 DIAGNOSIS — I12.9: Primary | ICD-10-CM

## 2017-08-18 DIAGNOSIS — R80.9: ICD-10-CM

## 2017-08-18 DIAGNOSIS — N18.3: ICD-10-CM

## 2017-08-18 DIAGNOSIS — E11.9: ICD-10-CM

## 2017-08-18 LAB
ANION GAP SERPL CALC-SCNC: 13 MMOL/L (ref 5–19)
BUN SERPL-MCNC: 43 MG/DL (ref 7–20)
CALCIUM: 9.3 MG/DL (ref 8.4–10.2)
CHLORIDE SERPL-SCNC: 101 MMOL/L (ref 98–107)
CO2 SERPL-SCNC: 26 MMOL/L (ref 22–30)
CREAT SERPL-MCNC: 2.36 MG/DL (ref 0.52–1.25)
ERYTHROCYTE [DISTWIDTH] IN BLOOD BY AUTOMATED COUNT: 17.8 % (ref 11.5–14)
GLUCOSE SERPL-MCNC: 273 MG/DL (ref 75–110)
HCT VFR BLD CALC: 24.6 % (ref 36–47)
HGB BLD-MCNC: 8 G/DL (ref 12–15.5)
HGB HCT DIFFERENCE: -0.6
MCH RBC QN AUTO: 27.1 PG (ref 27–33.4)
MCHC RBC AUTO-ENTMCNC: 32.4 G/DL (ref 32–36)
MCV RBC AUTO: 84 FL (ref 80–97)
POTASSIUM SERPL-SCNC: 3.6 MMOL/L (ref 3.6–5)
RBC # BLD AUTO: 2.95 10^6/UL (ref 3.72–5.28)
SODIUM SERPL-SCNC: 139.6 MMOL/L (ref 137–145)
WBC # BLD AUTO: 8.8 10^3/UL (ref 4–10.5)

## 2017-08-18 PROCEDURE — 80048 BASIC METABOLIC PNL TOTAL CA: CPT

## 2017-08-18 PROCEDURE — 36415 COLL VENOUS BLD VENIPUNCTURE: CPT

## 2017-08-18 PROCEDURE — 85027 COMPLETE CBC AUTOMATED: CPT

## 2017-08-27 ENCOUNTER — HOSPITAL ENCOUNTER (EMERGENCY)
Dept: HOSPITAL 62 - ER | Age: 77
Discharge: HOME | End: 2017-08-27
Payer: MEDICARE

## 2017-08-27 VITALS — SYSTOLIC BLOOD PRESSURE: 162 MMHG | DIASTOLIC BLOOD PRESSURE: 60 MMHG

## 2017-08-27 DIAGNOSIS — I11.0: ICD-10-CM

## 2017-08-27 DIAGNOSIS — I48.91: ICD-10-CM

## 2017-08-27 DIAGNOSIS — Z74.01: ICD-10-CM

## 2017-08-27 DIAGNOSIS — M25.562: Primary | ICD-10-CM

## 2017-08-27 DIAGNOSIS — I69.951: ICD-10-CM

## 2017-08-27 DIAGNOSIS — I50.9: ICD-10-CM

## 2017-08-27 DIAGNOSIS — I25.2: ICD-10-CM

## 2017-08-27 PROCEDURE — 99283 EMERGENCY DEPT VISIT LOW MDM: CPT

## 2017-08-27 NOTE — RADIOLOGY REPORT (SQ)
EXAM DESCRIPTION:  KNEE LEFT 3 VIEWS



COMPLETED DATE/TIME:  8/27/2017 2:33 pm



REASON FOR STUDY:  pain



COMPARISON:  None.



NUMBER OF VIEWS:  Three views.



TECHNIQUE:  AP, lateral, and sunrise patella radiographic images acquired of the left knee.



LIMITATIONS:  None.



FINDINGS:  MINERALIZATION: Osteopenia.

BONES: No acute fracture or dislocation.  No worrisome bone lesions.

JOINT: No effusion.

SOFT TISSUES: Vascular calcifications.

OTHER: Old femoral fracture status post plate and screw fixation.  Moderate osteoarthritic changes in
 the medial and patellofemoral compartments.



IMPRESSION:   NO RADIOGRAPHIC EVIDENCE OF ACUTE INJURY.



TECHNICAL DOCUMENTATION:  JOB ID:  [id number]

 2011 [employer]- All Rights Reserved

## 2017-08-27 NOTE — ER DOCUMENT REPORT
HPI





- HPI


Onset: Last week


Onset/Duration: Gradual


Quality of pain: Dull


Pain Level: 3


Associated Symptoms: None


Exacerbated by: Standing, Movement


Relieved by: Denies


Similar symptoms previously: No


Recently seen / treated by doctor: Yes - friday but did not mention this 

complaint


Notes: 





76-year-old female with history of CVA.  She has resulted right-sided deficits.

  She is nonambulatory at baseline.  Per daughter, patient's been complaining 

of left knee pain for about 1 week.  There is no associated injury.  Patient is 

bedbound at baseline.  She has taken some tramadol with some relief.





- ROS


Systems Reviewed and Negative: Yes All other systems reviewed and negative





- CARDIOVASCULAR


Cardiovascular: DENIES: Chest pain





- REPRODUCTIVE


Reproductive: DENIES: Pregnant:





- MUSCULOSKELETAL


Musculoskeletal: REPORTS: Extremity pain





- DERM


Skin Color: Normal





Past Medical History





- General


Information source: Patient





- Social History


Smoking Status: Never Smoker


Chew tobacco use (# tins/day): No


Frequency of alcohol use: None


Drug Abuse: None


Family History: None


Patient has suicidal ideation: No


Patient has homicidal ideation: No





- Past Medical History


Cardiac Medical History: Reports: Hx Atrial Fibrillation, Hx Congestive Heart 

Failure, Hx Coronary Artery Disease, Hx Heart Attack, Hx Hypercholesterolemia, 

Hx Hypertension


Pulmonary Medical History: 


   Denies: Hx Asthma, Hx Bronchitis, Hx COPD, Hx Pneumonia, Hx Tuberculosis


Neurological Medical History: Reports: Hx Cerebrovascular Accident - 2007,2008, 

Hx Seizures - 10 years ago


Endocrine Medical History: Reports: Hx Diabetes Mellitus Type 1, Hx Diabetes 

Mellitus Type 2


Renal/ Medical History: Denies: Hx Peritoneal Dialysis


Musculoskeltal Medical History: Denies Hx Arthritis


Psychiatric Medical History: Reports: Hx Depression


Past Surgical History: Reports: Hx Cardiac Catheterization - stent 2004.  Denies

: Hx Hysterectomy, Hx Pacemaker





- Immunizations


Hx Diphtheria, Pertussis, Tetanus Vaccination: Yes


Hx Pneumococcal Vaccination: 01/01/10





Vertical Provider Document





- CONSTITUTIONAL


Agree With Documented VS: Yes


Exam Limitations: Physical Impairment - Nonambulatory, wheelchair/bedbound


General Appearance: WD/WN, No Apparent Distress





- INFECTION CONTROL


TRAVEL OUTSIDE OF THE [place] IN LAST 30 DAYS: No





- HEENT


HEENT: Atraumatic





- NECK


Neck: Normal Inspection





- RESPIRATORY


Respiratory: Breath Sounds Normal


O2 Sat by Pulse Oximetry: 98





- GI/ABDOMEN


Gastrointestinal: Abdomen Soft





- MUSCULOSKELETAL/EXTREMETIES


Notes: 





Left knee with painful passive and active range of motion, small effusion, no 

erythema, no other deformity.  Other extremities unremarkable.





- NEURO


Level of Consciousness: Awake, Alert, Slow to Respond


Notes: 





Baseline mental status per family member with patient





Course





- Re-evaluation


Re-evalutation: 





08/27/17 13:42


Patient symptoms are likely arthritic in origin.  We will get plain film x-ray 

to rule out occult fracture.  She has tramadol for pain.  I would prefer not to 

prescribe anything stronger due to her age and state of debilitation.  

Discussed need for possible MRI for better diagnosis if symptoms persist.


08/27/17 14:18


Plain film of left knee is unremarkable.  She does have hardware seen in the 

distal aspect of the femur which appears intact.  Results discussed with patient

's family.  Recommend to continue tramadol as needed for pain.  Also recommend 

Tylenol in addition to the tramadol.





- Vital Signs


Vital signs: 


 











Temp Pulse Resp BP Pulse Ox


 


 98.4 F   64   20   157/63 H  98 


 


 08/27/17 13:21  08/27/17 13:21  08/27/17 13:21  08/27/17 13:21  08/27/17 13:21














- Diagnostic Test


Radiology reviewed: Image reviewed - nothing acute  seen





Discharge





- Discharge


Clinical Impression: 


 Knee pain





Condition: Good


Disposition: HOME, SELF-CARE


Instructions:  Osteoarthritis (OM)


Additional Instructions: 


You can take Tylenol 650 mg every 4 hours in addition to the tramadol for pain.

  Follow-up with your primary care provider.  An MRI may be beneficial if your 

symptoms do not improve in the next 5-7 days.  Return to the emergency 

department if worse or for any other problems.


Prescriptions: 


Tramadol HCl 50 mg PO QID PRN #20 tablet


 PRN Reason: pain

## 2017-09-25 ENCOUNTER — HOSPITAL ENCOUNTER (OUTPATIENT)
Dept: HOSPITAL 62 - OD | Age: 77
End: 2017-09-25
Attending: INTERNAL MEDICINE
Payer: MEDICARE

## 2017-09-25 DIAGNOSIS — N18.4: ICD-10-CM

## 2017-09-25 DIAGNOSIS — I12.9: Primary | ICD-10-CM

## 2017-09-25 DIAGNOSIS — R60.9: ICD-10-CM

## 2017-09-25 DIAGNOSIS — D64.9: ICD-10-CM

## 2017-09-25 DIAGNOSIS — E11.9: ICD-10-CM

## 2017-09-25 LAB
ALBUMIN SERPL-MCNC: 4.3 G/DL (ref 3.5–5)
ALP SERPL-CCNC: 79 U/L (ref 38–126)
ALT SERPL-CCNC: 22 U/L (ref 9–52)
ANION GAP SERPL CALC-SCNC: 19 MMOL/L (ref 5–19)
AST SERPL-CCNC: 16 U/L (ref 14–36)
BILIRUB DIRECT SERPL-MCNC: 0.3 MG/DL (ref 0–0.4)
BILIRUB SERPL-MCNC: 0.3 MG/DL (ref 0.2–1.3)
BUN SERPL-MCNC: 39 MG/DL (ref 7–20)
CALCIUM: 10.1 MG/DL (ref 8.4–10.2)
CHLORIDE SERPL-SCNC: 98 MMOL/L (ref 98–107)
CO2 SERPL-SCNC: 27 MMOL/L (ref 22–30)
CREAT SERPL-MCNC: 2.13 MG/DL (ref 0.52–1.25)
FERRITIN SERPL-MCNC: 312 NG/ML (ref 11.1–264)
GLUCOSE SERPL-MCNC: 267 MG/DL (ref 75–110)
POTASSIUM SERPL-SCNC: 3.9 MMOL/L (ref 3.6–5)
PROT SERPL-MCNC: 7.8 G/DL (ref 6.3–8.2)
SODIUM SERPL-SCNC: 143.5 MMOL/L (ref 137–145)

## 2017-09-25 PROCEDURE — 83550 IRON BINDING TEST: CPT

## 2017-09-25 PROCEDURE — 80053 COMPREHEN METABOLIC PANEL: CPT

## 2017-09-25 PROCEDURE — 36415 COLL VENOUS BLD VENIPUNCTURE: CPT

## 2017-09-25 PROCEDURE — 84165 PROTEIN E-PHORESIS SERUM: CPT

## 2017-09-25 PROCEDURE — 82728 ASSAY OF FERRITIN: CPT

## 2017-09-25 PROCEDURE — 83540 ASSAY OF IRON: CPT

## 2017-09-25 PROCEDURE — 84100 ASSAY OF PHOSPHORUS: CPT

## 2017-09-27 LAB
ALBUMIN/GLOB SERPL: 0.9 {RATIO} (ref 0.7–1.7)
ALPHA-1-GLOBULIN 2: 0.3 G/DL (ref 0–0.4)
GAMMA GLOB SERPL ELPH-MCNC: 1 G/DL (ref 0.4–1.8)
OTHER ANTIBIOTIC SUSC ISLT: 3.5 G/DL (ref 2.9–4.4)
PROT SERPL-MCNC: 7.4 G/DL (ref 6–8.5)

## 2017-12-01 ENCOUNTER — HOSPITAL ENCOUNTER (OUTPATIENT)
Dept: HOSPITAL 62 - OD | Age: 77
End: 2017-12-01
Attending: INTERNAL MEDICINE
Payer: MEDICARE

## 2017-12-01 DIAGNOSIS — E11.22: ICD-10-CM

## 2017-12-01 DIAGNOSIS — D63.1: ICD-10-CM

## 2017-12-01 DIAGNOSIS — I12.9: ICD-10-CM

## 2017-12-01 DIAGNOSIS — N18.4: Primary | ICD-10-CM

## 2017-12-01 LAB
ALBUMIN SERPL-MCNC: 4.1 G/DL (ref 3.5–5)
ALP SERPL-CCNC: 64 U/L (ref 38–126)
ALT SERPL-CCNC: 23 U/L (ref 9–52)
ANION GAP SERPL CALC-SCNC: 17 MMOL/L (ref 5–19)
AST SERPL-CCNC: 16 U/L (ref 14–36)
BILIRUB DIRECT SERPL-MCNC: 0.2 MG/DL (ref 0–0.4)
BILIRUB SERPL-MCNC: 0.3 MG/DL (ref 0.2–1.3)
BUN SERPL-MCNC: 36 MG/DL (ref 7–20)
CALCIUM: 9.5 MG/DL (ref 8.4–10.2)
CHLORIDE SERPL-SCNC: 99 MMOL/L (ref 98–107)
CO2 SERPL-SCNC: 29 MMOL/L (ref 22–30)
CREAT SERPL-MCNC: 2.19 MG/DL (ref 0.52–1.25)
ERYTHROCYTE [DISTWIDTH] IN BLOOD BY AUTOMATED COUNT: 19.4 % (ref 11.5–14)
FERRITIN SERPL-MCNC: 203 NG/ML (ref 11.1–264)
GLUCOSE SERPL-MCNC: 221 MG/DL (ref 75–110)
HCT VFR BLD CALC: 30.4 % (ref 36–47)
HGB BLD-MCNC: 9.9 G/DL (ref 12–15.5)
HGB HCT DIFFERENCE: -0.7
MCH RBC QN AUTO: 26.6 PG (ref 27–33.4)
MCHC RBC AUTO-ENTMCNC: 32.7 G/DL (ref 32–36)
MCV RBC AUTO: 81 FL (ref 80–97)
PHOSPHATE SERPL-MCNC: 4 MG/DL (ref 2.5–4.5)
POTASSIUM SERPL-SCNC: 3.8 MMOL/L (ref 3.6–5)
PROT SERPL-MCNC: 6.9 G/DL (ref 6.3–8.2)
RBC # BLD AUTO: 3.74 10^6/UL (ref 3.72–5.28)
SODIUM SERPL-SCNC: 144.9 MMOL/L (ref 137–145)
WBC # BLD AUTO: 10.2 10^3/UL (ref 4–10.5)

## 2017-12-01 PROCEDURE — 84100 ASSAY OF PHOSPHORUS: CPT

## 2017-12-01 PROCEDURE — 83540 ASSAY OF IRON: CPT

## 2017-12-01 PROCEDURE — 80053 COMPREHEN METABOLIC PANEL: CPT

## 2017-12-01 PROCEDURE — 82728 ASSAY OF FERRITIN: CPT

## 2017-12-01 PROCEDURE — 36415 COLL VENOUS BLD VENIPUNCTURE: CPT

## 2017-12-01 PROCEDURE — 84165 PROTEIN E-PHORESIS SERUM: CPT

## 2017-12-01 PROCEDURE — 85027 COMPLETE CBC AUTOMATED: CPT

## 2017-12-01 PROCEDURE — 83970 ASSAY OF PARATHORMONE: CPT

## 2017-12-01 PROCEDURE — 83550 IRON BINDING TEST: CPT

## 2018-02-09 ENCOUNTER — HOSPITAL ENCOUNTER (OUTPATIENT)
Dept: HOSPITAL 62 - OD | Age: 78
End: 2018-02-09
Attending: INTERNAL MEDICINE
Payer: MEDICARE

## 2018-02-09 DIAGNOSIS — N18.4: ICD-10-CM

## 2018-02-09 DIAGNOSIS — D64.9: ICD-10-CM

## 2018-02-09 DIAGNOSIS — R80.9: ICD-10-CM

## 2018-02-09 DIAGNOSIS — I12.9: ICD-10-CM

## 2018-02-09 DIAGNOSIS — E11.22: Primary | ICD-10-CM

## 2018-02-09 LAB
ANION GAP SERPL CALC-SCNC: 12 MMOL/L (ref 5–19)
BUN SERPL-MCNC: 33 MG/DL (ref 7–20)
CALCIUM: 9.8 MG/DL (ref 8.4–10.2)
CHLORIDE SERPL-SCNC: 99 MMOL/L (ref 98–107)
CO2 SERPL-SCNC: 33 MMOL/L (ref 22–30)
ERYTHROCYTE [DISTWIDTH] IN BLOOD BY AUTOMATED COUNT: 17.3 % (ref 11.5–14)
FERRITIN SERPL-MCNC: 154 NG/ML (ref 11.1–264)
GLUCOSE SERPL-MCNC: 236 MG/DL (ref 75–110)
HCT VFR BLD CALC: 31.1 % (ref 36–47)
HGB BLD-MCNC: 10.2 G/DL (ref 12–15.5)
IRON(TIBC): 49.4 UG/DL (ref 37–170)
MCH RBC QN AUTO: 27.1 PG (ref 27–33.4)
MCHC RBC AUTO-ENTMCNC: 32.8 G/DL (ref 32–36)
MCV RBC AUTO: 83 FL (ref 80–97)
PHOSPHATE SERPL-MCNC: 4.3 MG/DL (ref 2.5–4.5)
PLATELET # BLD: 247 10^3/UL (ref 150–450)
POTASSIUM SERPL-SCNC: 3.2 MMOL/L (ref 3.6–5)
RBC # BLD AUTO: 3.76 10^6/UL (ref 3.72–5.28)
SODIUM SERPL-SCNC: 143.8 MMOL/L (ref 137–145)
WBC # BLD AUTO: 8.7 10^3/UL (ref 4–10.5)

## 2018-02-09 PROCEDURE — 85027 COMPLETE CBC AUTOMATED: CPT

## 2018-02-09 PROCEDURE — 82728 ASSAY OF FERRITIN: CPT

## 2018-02-09 PROCEDURE — 36415 COLL VENOUS BLD VENIPUNCTURE: CPT

## 2018-02-09 PROCEDURE — 80048 BASIC METABOLIC PNL TOTAL CA: CPT

## 2018-02-09 PROCEDURE — 84100 ASSAY OF PHOSPHORUS: CPT

## 2018-02-09 PROCEDURE — 83550 IRON BINDING TEST: CPT

## 2018-02-09 PROCEDURE — 83970 ASSAY OF PARATHORMONE: CPT

## 2018-02-09 PROCEDURE — 83540 ASSAY OF IRON: CPT

## 2018-07-25 ENCOUNTER — HOSPITAL ENCOUNTER (OUTPATIENT)
Dept: HOSPITAL 62 - OD | Age: 78
End: 2018-07-25
Attending: INTERNAL MEDICINE
Payer: MEDICARE

## 2018-07-25 DIAGNOSIS — E11.9: ICD-10-CM

## 2018-07-25 DIAGNOSIS — D64.9: ICD-10-CM

## 2018-07-25 DIAGNOSIS — N18.4: ICD-10-CM

## 2018-07-25 DIAGNOSIS — I12.9: Primary | ICD-10-CM

## 2018-07-25 LAB
ANION GAP SERPL CALC-SCNC: 15 MMOL/L (ref 5–19)
APPEARANCE UR: CLEAR
APTT PPP: (no result) S
BILIRUB UR QL STRIP: NEGATIVE
BUN SERPL-MCNC: 43 MG/DL (ref 7–20)
CALCIUM: 9.5 MG/DL (ref 8.4–10.2)
CHLORIDE SERPL-SCNC: 97 MMOL/L (ref 98–107)
CO2 SERPL-SCNC: 33 MMOL/L (ref 22–30)
ERYTHROCYTE [DISTWIDTH] IN BLOOD BY AUTOMATED COUNT: 16.7 % (ref 11.5–14)
GLUCOSE SERPL-MCNC: 194 MG/DL (ref 75–110)
GLUCOSE UR STRIP-MCNC: NEGATIVE MG/DL
HCT VFR BLD CALC: 27.3 % (ref 36–47)
HGB BLD-MCNC: 9.2 G/DL (ref 12–15.5)
KETONES UR STRIP-MCNC: NEGATIVE MG/DL
MCH RBC QN AUTO: 28.4 PG (ref 27–33.4)
MCHC RBC AUTO-ENTMCNC: 33.7 G/DL (ref 32–36)
MCV RBC AUTO: 84 FL (ref 80–97)
NITRITE UR QL STRIP: NEGATIVE
PH UR STRIP: 7 [PH] (ref 5–9)
PLATELET # BLD: 254 10^3/UL (ref 150–450)
POTASSIUM SERPL-SCNC: 3.4 MMOL/L (ref 3.6–5)
PROT UR STRIP-MCNC: >=500 MG/DL
RBC # BLD AUTO: 3.23 10^6/UL (ref 3.72–5.28)
SODIUM SERPL-SCNC: 145.3 MMOL/L (ref 137–145)
SP GR UR STRIP: 1.01
UROBILINOGEN UR-MCNC: NEGATIVE MG/DL (ref ?–2)
WBC # BLD AUTO: 10.1 10^3/UL (ref 4–10.5)

## 2018-07-25 PROCEDURE — 36415 COLL VENOUS BLD VENIPUNCTURE: CPT

## 2018-07-25 PROCEDURE — 81001 URINALYSIS AUTO W/SCOPE: CPT

## 2018-07-25 PROCEDURE — 85027 COMPLETE CBC AUTOMATED: CPT

## 2018-07-25 PROCEDURE — 80048 BASIC METABOLIC PNL TOTAL CA: CPT

## 2018-08-01 ENCOUNTER — HOSPITAL ENCOUNTER (OUTPATIENT)
Dept: HOSPITAL 62 - OD | Age: 78
End: 2018-08-01
Attending: PHYSICIAN ASSISTANT
Payer: MEDICARE

## 2018-08-01 DIAGNOSIS — D64.9: Primary | ICD-10-CM

## 2018-08-01 DIAGNOSIS — E87.6: ICD-10-CM

## 2018-08-01 DIAGNOSIS — N18.4: ICD-10-CM

## 2018-08-01 LAB
FERRITIN SERPL-MCNC: 156 NG/ML (ref 11.1–264)
IRON(TIBC): 34.7 UG/DL (ref 37–170)
POTASSIUM SERPL-SCNC: 3.4 MMOL/L (ref 3.6–5)

## 2018-08-01 PROCEDURE — 83540 ASSAY OF IRON: CPT

## 2018-08-01 PROCEDURE — 36415 COLL VENOUS BLD VENIPUNCTURE: CPT

## 2018-08-01 PROCEDURE — 83550 IRON BINDING TEST: CPT

## 2018-08-01 PROCEDURE — 82728 ASSAY OF FERRITIN: CPT

## 2018-08-01 PROCEDURE — 84132 ASSAY OF SERUM POTASSIUM: CPT

## 2018-08-30 ENCOUNTER — HOSPITAL ENCOUNTER (OUTPATIENT)
Dept: HOSPITAL 62 - OD | Age: 78
End: 2018-08-30
Attending: PHYSICIAN ASSISTANT
Payer: MEDICARE

## 2018-08-30 DIAGNOSIS — E87.6: ICD-10-CM

## 2018-08-30 DIAGNOSIS — E11.9: ICD-10-CM

## 2018-08-30 DIAGNOSIS — N18.4: Primary | ICD-10-CM

## 2018-08-30 DIAGNOSIS — D64.9: ICD-10-CM

## 2018-08-30 LAB
ANION GAP SERPL CALC-SCNC: 17 MMOL/L (ref 5–19)
APPEARANCE UR: CLEAR
APTT PPP: YELLOW S
BILIRUB UR QL STRIP: NEGATIVE
BUN SERPL-MCNC: 70 MG/DL (ref 7–20)
CALCIUM: 9.8 MG/DL (ref 8.4–10.2)
CHLORIDE SERPL-SCNC: 93 MMOL/L (ref 98–107)
CO2 SERPL-SCNC: 33 MMOL/L (ref 22–30)
ERYTHROCYTE [DISTWIDTH] IN BLOOD BY AUTOMATED COUNT: 16.1 % (ref 11.5–14)
GLUCOSE SERPL-MCNC: 142 MG/DL (ref 75–110)
GLUCOSE UR STRIP-MCNC: NEGATIVE MG/DL
HCT VFR BLD CALC: 27.5 % (ref 36–47)
HGB BLD-MCNC: 9.1 G/DL (ref 12–15.5)
KETONES UR STRIP-MCNC: NEGATIVE MG/DL
MCH RBC QN AUTO: 28.2 PG (ref 27–33.4)
MCHC RBC AUTO-ENTMCNC: 33.2 G/DL (ref 32–36)
MCV RBC AUTO: 85 FL (ref 80–97)
NITRITE UR QL STRIP: NEGATIVE
PH UR STRIP: 6 [PH] (ref 5–9)
PLATELET # BLD: 218 10^3/UL (ref 150–450)
POTASSIUM SERPL-SCNC: 3.7 MMOL/L (ref 3.6–5)
PROT UR STRIP-MCNC: >=500 MG/DL
RBC # BLD AUTO: 3.24 10^6/UL (ref 3.72–5.28)
SODIUM SERPL-SCNC: 143.4 MMOL/L (ref 137–145)
SP GR UR STRIP: 1.01
UROBILINOGEN UR-MCNC: NEGATIVE MG/DL (ref ?–2)
WBC # BLD AUTO: 7.5 10^3/UL (ref 4–10.5)

## 2018-08-30 PROCEDURE — 83540 ASSAY OF IRON: CPT

## 2018-08-30 PROCEDURE — 82728 ASSAY OF FERRITIN: CPT

## 2018-08-30 PROCEDURE — 36415 COLL VENOUS BLD VENIPUNCTURE: CPT

## 2018-08-30 PROCEDURE — 83550 IRON BINDING TEST: CPT

## 2018-08-30 PROCEDURE — 81001 URINALYSIS AUTO W/SCOPE: CPT

## 2018-08-30 PROCEDURE — 85027 COMPLETE CBC AUTOMATED: CPT

## 2018-08-30 PROCEDURE — 80048 BASIC METABOLIC PNL TOTAL CA: CPT

## 2018-08-31 LAB
FERRITIN SERPL-MCNC: 186 NG/ML (ref 11.1–264)
IRON(TIBC): 33.3 UG/DL (ref 37–170)

## 2018-09-11 ENCOUNTER — HOSPITAL ENCOUNTER (OUTPATIENT)
Dept: HOSPITAL 62 - II | Age: 78
Discharge: HOME | End: 2018-09-11
Attending: INTERNAL MEDICINE
Payer: MEDICARE

## 2018-09-11 VITALS — DIASTOLIC BLOOD PRESSURE: 59 MMHG | SYSTOLIC BLOOD PRESSURE: 181 MMHG

## 2018-09-11 DIAGNOSIS — D50.8: Primary | ICD-10-CM

## 2018-09-11 PROCEDURE — 96367 TX/PROPH/DG ADDL SEQ IV INF: CPT

## 2018-09-11 PROCEDURE — 96365 THER/PROPH/DIAG IV INF INIT: CPT

## 2018-09-11 PROCEDURE — 3E033GC INTRODUCTION OF OTHER THERAPEUTIC SUBSTANCE INTO PERIPHERAL VEIN, PERCUTANEOUS APPROACH: ICD-10-PCS | Performed by: INTERNAL MEDICINE

## 2018-11-02 ENCOUNTER — HOSPITAL ENCOUNTER (OUTPATIENT)
Dept: HOSPITAL 62 - OD | Age: 78
End: 2018-11-02
Attending: PHYSICIAN ASSISTANT
Payer: MEDICARE

## 2018-11-02 DIAGNOSIS — E11.22: Primary | ICD-10-CM

## 2018-11-02 DIAGNOSIS — D64.9: ICD-10-CM

## 2018-11-02 DIAGNOSIS — N18.4: ICD-10-CM

## 2018-11-02 DIAGNOSIS — E87.6: ICD-10-CM

## 2018-11-02 LAB
ANION GAP SERPL CALC-SCNC: 13 MMOL/L (ref 5–19)
APPEARANCE UR: (no result)
APTT PPP: YELLOW S
BILIRUB UR QL STRIP: NEGATIVE
BUN SERPL-MCNC: 59 MG/DL (ref 7–20)
CALCIUM: 10 MG/DL (ref 8.4–10.2)
CHLORIDE SERPL-SCNC: 97 MMOL/L (ref 98–107)
CO2 SERPL-SCNC: 34 MMOL/L (ref 22–30)
ERYTHROCYTE [DISTWIDTH] IN BLOOD BY AUTOMATED COUNT: 16.6 % (ref 11.5–14)
GLUCOSE SERPL-MCNC: 163 MG/DL (ref 75–110)
GLUCOSE UR STRIP-MCNC: NEGATIVE MG/DL
HCT VFR BLD CALC: 29.7 % (ref 36–47)
HGB BLD-MCNC: 10.2 G/DL (ref 12–15.5)
IRON(TIBC): 45.5 UG/DL (ref 37–170)
KETONES UR STRIP-MCNC: NEGATIVE MG/DL
MCH RBC QN AUTO: 29.8 PG (ref 27–33.4)
MCHC RBC AUTO-ENTMCNC: 34.5 G/DL (ref 32–36)
MCV RBC AUTO: 87 FL (ref 80–97)
NITRITE UR QL STRIP: NEGATIVE
PH UR STRIP: 6 [PH] (ref 5–9)
PLATELET # BLD: 191 10^3/UL (ref 150–450)
POTASSIUM SERPL-SCNC: 3.4 MMOL/L (ref 3.6–5)
PROT UR STRIP-MCNC: 100 MG/DL
RBC # BLD AUTO: 3.43 10^6/UL (ref 3.72–5.28)
SODIUM SERPL-SCNC: 143.8 MMOL/L (ref 137–145)
SP GR UR STRIP: 1.01
UROBILINOGEN UR-MCNC: NEGATIVE MG/DL (ref ?–2)
WBC # BLD AUTO: 9.2 10^3/UL (ref 4–10.5)

## 2018-11-02 PROCEDURE — 83550 IRON BINDING TEST: CPT

## 2018-11-02 PROCEDURE — 81001 URINALYSIS AUTO W/SCOPE: CPT

## 2018-11-02 PROCEDURE — 85027 COMPLETE CBC AUTOMATED: CPT

## 2018-11-02 PROCEDURE — 80048 BASIC METABOLIC PNL TOTAL CA: CPT

## 2018-11-02 PROCEDURE — 82728 ASSAY OF FERRITIN: CPT

## 2018-11-02 PROCEDURE — 83540 ASSAY OF IRON: CPT

## 2018-11-02 PROCEDURE — 36415 COLL VENOUS BLD VENIPUNCTURE: CPT

## 2019-10-23 ENCOUNTER — HOSPITAL ENCOUNTER (EMERGENCY)
Dept: HOSPITAL 62 - ER | Age: 79
Discharge: HOME | End: 2019-10-23
Payer: MEDICARE

## 2019-10-23 VITALS — DIASTOLIC BLOOD PRESSURE: 54 MMHG | SYSTOLIC BLOOD PRESSURE: 136 MMHG

## 2019-10-23 DIAGNOSIS — L89.152: Primary | ICD-10-CM

## 2019-10-23 DIAGNOSIS — Z95.5: ICD-10-CM

## 2019-10-23 DIAGNOSIS — E11.9: ICD-10-CM

## 2019-10-23 DIAGNOSIS — I25.10: ICD-10-CM

## 2019-10-23 DIAGNOSIS — I10: ICD-10-CM

## 2019-10-23 PROCEDURE — 99282 EMERGENCY DEPT VISIT SF MDM: CPT

## 2019-10-23 NOTE — ER DOCUMENT REPORT
ED Skin Rash/Insect Bite/Abscs





- General


Chief Complaint: Skin Problem


Stated Complaint: ABSCESS/BUTTOCKS


Time Seen by Provider: 10/23/19 10:48


Primary Care Provider: 


AVIVA POTTER MD [Primary Care Provider] - Follow up as needed


Notes: 


79-year-old nonambulatory female with insulin-dependent diabetes mellitus, 

hypertension, coronary artery disease presents to the emergency department with 

daughter with chief complaint of a pressure injury on her sacrum.  Daughter 

noticed it about a week and a half ago and when she slid her to change earlier 

today she noticed that some skin sloughed off prompting her to get seen.  

Patient recently just got back from a long round trip car ride from [place]

and daughter is concerned that may have worsened the condition.  Denies fevers 

or chills, denies nausea or vomiting, denies any numbness or tingling in her 

extremities, does complain of pain


TRAVEL OUTSIDE OF THE [place] IN LAST 30 DAYS: No





- Related Data


Allergies/Adverse Reactions: 


                                        





No Known Allergies Allergy (Unverified 12/02/11 18:04)


   











Past Medical History





- Social History


Smoking Status: Never Smoker


Frequency of alcohol use: None


Drug Abuse: None


Family History: None


Patient has suicidal ideation: No


Patient has homicidal ideation: No





- Past Medical History


Cardiac Medical History: Reports: Hx Atrial Fibrillation, Hx Congestive Heart 

Failure, Hx Coronary Artery Disease, Hx Heart Attack, Hx Hypercholesterolemia, 

Hx Hypertension


Pulmonary Medical History: 


   Denies: Hx Asthma, Hx Bronchitis, Hx COPD, Hx Pneumonia, Hx Tuberculosis


Neurological Medical History: Reports: Hx Cerebrovascular Accident - 2007,2008, 

Hx Seizures - 10 years ago


Endocrine Medical History: Reports: Hx Diabetes Mellitus Type 1, Hx Diabetes 

Mellitus Type 2


Renal/ Medical History: Denies: Hx Peritoneal Dialysis


Musculoskeletal Medical History: Denies Hx Arthritis


Psychiatric Medical History: Reports: Hx Depression


Past Surgical History: Reports: Hx Cardiac Catheterization - stent 2004.  

Denies: Hx Hysterectomy, Hx Pacemaker





- Immunizations


Hx Diphtheria, Pertussis, Tetanus Vaccination: Yes


Hx Pneumococcal Vaccination: 01/01/10





Review of Systems





- Review of Systems


Constitutional: See HPI


EENT: No symptoms reported


Cardiovascular: See HPI


Respiratory: See HPI


Gastrointestinal: No symptoms reported


Genitourinary: No symptoms reported


Female Genitourinary: No symptoms reported


Musculoskeletal: No symptoms reported


Skin: See HPI


Hematologic/Lymphatic: No symptoms reported


Neurological/Psychological: See HPI





Physical Exam





- Notes


Notes: 





PHYSICAL EXAMINATION:





Reviewed vital signs and charting by RN





GENERAL: Alert, cachectic. No acute distress.


HEAD: Normocephalic, atraumatic.


EYES: Pupils equal and round. Extraocular movements intact.


ENT: Oral mucosa moist, tongue midline. 


NECK: Full range of motion. Trachea midline.


LUNGS: Clear to auscultation bilaterally, no wheezes, rales, or rhonchi. No 

respiratory distress.


HEART: Regular rate and rhythm. No murmur


ABDOMEN: soft, non-tender.  No distention. Bowel sounds present


EXTREMITIES: Moves all 4 extremities spontaneously. No edema,  No cyanosis.


PSYCH: Normal affect, normal mood.


SKIN: Warm, dry, normal turgor.  Sacral pressure injury approximately 2 inches x

4 inches at the gluteal cleft consistent with a stage II pressure injury with 

some minor sloughing of the epithelial layer of the skin, no subcutaneous tissue

visualized, is still blanchable with some erythema





Course





- Re-evaluation


Re-evalutation: 





10/23/19 13:50


In no acute distress.  I did call the wound clinic for consultation and their 

recommendation until she can get seen by them is to apply a thick barrier cream 

with zinc oxide and to ensure that the patient stays off of the wound.  I did 

ensure that she will be able to get follow-up through them but it may be 1 week 

or so and I wanted to ensure that we have proper wound care in the interim.  I 

explained this to the patient's daughter and she understands fully and does 

understand the return precautions.  At this time patient is stable for 

discharge.





Discharge





- Discharge


Clinical Impression: 


 Pressure injury of sacral region, stage 2





Condition: Good


Disposition: HOME, SELF-CARE


Additional Instructions: 


You were seen in the emergency department this afternoon for a sacral pressure 

injury.  It appears that it is a stage II.  The old terminology are called 

pressure ulcers.  I have contacted the wound care clinic and am going to give 

you referral.  They will contact you once they receive your information.  You 

can also follow-up with them and call them in the morning.





Their recommendation is to apply a thick barrier cream with zinc oxide.  

Examples are Desitin and/or Endit, both of which can be purchased at [employer].  

Please use that as needed to protect the wound and ensure consistent turning as 

you are doing.





These return to the emergency department if you develop fever or chills, severe 

redness or worsening redness around the site, you lose feeling in your legs, the

wound deteriorates worse, or you have any other concerning symptoms.


Referrals: 


AVIVA POTTER MD [Primary Care Provider] - Follow up as needed

## 2019-10-23 NOTE — ER DOCUMENT REPORT
ED Medical Screen (RME)





- General


Chief Complaint: Abscess


Stated Complaint: ABSCESS/BUTTOCKS


Time Seen by Provider: 10/23/19 10:48


Primary Care Provider: 


AVIVA POTTER MD [Primary Care Provider] - Follow up as needed


Notes: 





Patient is nonambulatory and daughter helps with transfers.  Patient recently 

went on a car ride to [place] and back.  Daughter is concerned that the 

long car ride worsened her decubitus ulcer to the sacrum.





I have greeted and performed a rapid initial assessment of this patient.  A 

comprehensive ED assessment and evaluation of the patient, analysis of test 

results and completion of the medical decision making process will be conducted 

by additional ED providers.


TRAVEL OUTSIDE OF THE [place] IN LAST 30 DAYS: No





- Related Data


Allergies/Adverse Reactions: 


                                        





No Known Allergies Allergy (Unverified 12/02/11 18:04)


   











Past Medical History





- Past Medical History


Cardiac Medical History: Reports: Hx Atrial Fibrillation, Hx Congestive Heart 

Failure, Hx Coronary Artery Disease, Hx Heart Attack, Hx Hypercholesterolemia, 

Hx Hypertension


Pulmonary Medical History: 


   Denies: Hx Asthma, Hx Bronchitis, Hx COPD, Hx Pneumonia, Hx Tuberculosis


Neurological Medical History: Reports: Hx Cerebrovascular Accident - 2007,2008, 

Hx Seizures - 10 years ago


Endocrine Medical History: Reports: Hx Diabetes Mellitus Type 1, Hx Diabetes 

Mellitus Type 2


Renal/ Medical History: Denies: Hx Peritoneal Dialysis


Musculoskeltal Medical History: Denies Hx Arthritis


Psychiatric Medical History: Reports: Hx Depression


Past Surgical History: Reports: Hx Cardiac Catheterization - stent 2004.  

Denies: Hx Hysterectomy, Hx Pacemaker





- Immunizations


Hx Diphtheria, Pertussis, Tetanus Vaccination: Yes





Physical Exam





- General


General appearance: Appears well, Alert


Notes: 





stage III decubitus ulcer to sacrum





Doctor's Discharge





- Discharge


Referrals: 


AVIVA POTTER MD [Primary Care Provider] - Follow up as needed

## 2019-12-23 ENCOUNTER — HOSPITAL ENCOUNTER (EMERGENCY)
Dept: HOSPITAL 62 - ER | Age: 79
Discharge: HOME | End: 2019-12-23
Payer: MEDICARE

## 2019-12-23 VITALS — SYSTOLIC BLOOD PRESSURE: 133 MMHG | DIASTOLIC BLOOD PRESSURE: 55 MMHG

## 2019-12-23 DIAGNOSIS — I25.10: ICD-10-CM

## 2019-12-23 DIAGNOSIS — I25.2: ICD-10-CM

## 2019-12-23 DIAGNOSIS — N18.9: Primary | ICD-10-CM

## 2019-12-23 DIAGNOSIS — K62.5: ICD-10-CM

## 2019-12-23 DIAGNOSIS — I50.9: ICD-10-CM

## 2019-12-23 DIAGNOSIS — E11.9: ICD-10-CM

## 2019-12-23 DIAGNOSIS — I11.0: ICD-10-CM

## 2019-12-23 DIAGNOSIS — N30.00: ICD-10-CM

## 2019-12-23 LAB
ADD MANUAL DIFF: NO
ALBUMIN SERPL-MCNC: 3.8 G/DL (ref 3.5–5)
ALP SERPL-CCNC: 53 U/L (ref 38–126)
ANION GAP SERPL CALC-SCNC: 12 MMOL/L (ref 5–19)
APPEARANCE UR: CLEAR
APTT BLD: 36.4 SEC (ref 23.5–35.8)
APTT PPP: YELLOW S
AST SERPL-CCNC: 21 U/L (ref 14–36)
BASOPHILS # BLD AUTO: 0.1 10^3/UL (ref 0–0.2)
BASOPHILS NFR BLD AUTO: 0.6 % (ref 0–2)
BILIRUB DIRECT SERPL-MCNC: 0.2 MG/DL (ref 0–0.4)
BILIRUB SERPL-MCNC: 0.4 MG/DL (ref 0.2–1.3)
BILIRUB UR QL STRIP: NEGATIVE
BUN SERPL-MCNC: 44 MG/DL (ref 7–20)
CALCIUM: 9.6 MG/DL (ref 8.4–10.2)
CHLORIDE SERPL-SCNC: 104 MMOL/L (ref 98–107)
CO2 SERPL-SCNC: 27 MMOL/L (ref 22–30)
EOSINOPHIL # BLD AUTO: 0.2 10^3/UL (ref 0–0.6)
EOSINOPHIL NFR BLD AUTO: 1.7 % (ref 0–6)
ERYTHROCYTE [DISTWIDTH] IN BLOOD BY AUTOMATED COUNT: 15.8 % (ref 11.5–14)
GLUCOSE SERPL-MCNC: 127 MG/DL (ref 75–110)
GLUCOSE UR STRIP-MCNC: NEGATIVE MG/DL
HCT VFR BLD CALC: 27.3 % (ref 36–47)
HGB BLD-MCNC: 9.1 G/DL (ref 12–15.5)
INR PPP: 1.15
KETONES UR STRIP-MCNC: NEGATIVE MG/DL
LYMPHOCYTES # BLD AUTO: 1.1 10^3/UL (ref 0.5–4.7)
LYMPHOCYTES NFR BLD AUTO: 11.4 % (ref 13–45)
MCH RBC QN AUTO: 28.7 PG (ref 27–33.4)
MCHC RBC AUTO-ENTMCNC: 33.2 G/DL (ref 32–36)
MCV RBC AUTO: 86 FL (ref 80–97)
MONOCYTES # BLD AUTO: 0.7 10^3/UL (ref 0.1–1.4)
MONOCYTES NFR BLD AUTO: 7.2 % (ref 3–13)
NEUTROPHILS # BLD AUTO: 7.9 10^3/UL (ref 1.7–8.2)
NEUTS SEG NFR BLD AUTO: 79.1 % (ref 42–78)
NITRITE UR QL STRIP: NEGATIVE
PH UR STRIP: 6 [PH] (ref 5–9)
PLATELET # BLD: 410 10^3/UL (ref 150–450)
POTASSIUM SERPL-SCNC: 4 MMOL/L (ref 3.6–5)
PROT SERPL-MCNC: 7.2 G/DL (ref 6.3–8.2)
PROT UR STRIP-MCNC: 100 MG/DL
PROTHROMBIN TIME: 14.7 SEC (ref 11.4–15.4)
RBC # BLD AUTO: 3.16 10^6/UL (ref 3.72–5.28)
SP GR UR STRIP: 1.01
TOTAL CELLS COUNTED % (AUTO): 100 %
UROBILINOGEN UR-MCNC: NEGATIVE MG/DL (ref ?–2)
WBC # BLD AUTO: 10 10^3/UL (ref 4–10.5)

## 2019-12-23 PROCEDURE — 99283 EMERGENCY DEPT VISIT LOW MDM: CPT

## 2019-12-23 PROCEDURE — 85730 THROMBOPLASTIN TIME PARTIAL: CPT

## 2019-12-23 PROCEDURE — 80053 COMPREHEN METABOLIC PANEL: CPT

## 2019-12-23 PROCEDURE — 81001 URINALYSIS AUTO W/SCOPE: CPT

## 2019-12-23 PROCEDURE — 96360 HYDRATION IV INFUSION INIT: CPT

## 2019-12-23 PROCEDURE — 85610 PROTHROMBIN TIME: CPT

## 2019-12-23 PROCEDURE — 85025 COMPLETE CBC W/AUTO DIFF WBC: CPT

## 2019-12-23 PROCEDURE — 36415 COLL VENOUS BLD VENIPUNCTURE: CPT

## 2019-12-23 NOTE — ER DOCUMENT REPORT
ED Medical Screen (RME)





- General


Stated Complaint: POSSIBLE BLEEDING FROM RECTUM/GENITAL AREA


Time Seen by Provider: 12/23/19 11:24


Primary Care Provider: 


AVIVA POTTER MD [Primary Care Provider] - Follow up as needed


Information source: Relative


Notes: 





Patient presents with daughters who state that she has had bleeding for the past

2 days.  Family is uncertain where the blood is coming from.  Family does not 

know if it is from her urine, vaginally or from the rectum.  Patient denies any 

pain or lightheadedness.  Family states that she does not eat well and has been 

placed under hospice.  One family member at bedside prefers not to have anything

discussed and would prefer provider only access information via the previous ER 

visit.  Family seem to not want patient to know that she is currently in hosp

ice.  Patient has a history of dementia and is unable to give meaningful 

history.





I have greeted and performed a rapid initial assessment of this patient.  A 

comprehensive ED assessment and evaluation of the patient, analysis of test 

results and completion of the medical decision making process will be conducted 

by additional ED providers.


TRAVEL OUTSIDE OF THE [place] IN LAST 30 DAYS: No





- Related Data


Allergies/Adverse Reactions: 


                                        





No Known Allergies Allergy (Verified 12/23/19 11:24)


   











Past Medical History





- Past Medical History


Cardiac Medical History: Reports: Hx Atrial Fibrillation, Hx Congestive Heart 

Failure, Hx Coronary Artery Disease, Hx Heart Attack, Hx Hypercholesterolemia, 

Hx Hypertension


Pulmonary Medical History: 


   Denies: Hx Asthma, Hx Bronchitis, Hx COPD, Hx Pneumonia, Hx Tuberculosis


Neurological Medical History: Reports: Hx Cerebrovascular Accident - 2007,2008, 

Hx Seizures - 10 years ago


Endocrine Medical History: Reports: Hx Diabetes Mellitus Type 1, Hx Diabetes 

Mellitus Type 2


Renal/ Medical History: Denies: Hx Peritoneal Dialysis


Musculoskeltal Medical History: Denies Hx Arthritis


Psychiatric Medical History: Reports: Hx Depression


Past Surgical History: Reports: Hx Cardiac Catheterization - stent 2004.  

Denies: Hx Hysterectomy, Hx Pacemaker





- Immunizations


Hx Diphtheria, Pertussis, Tetanus Vaccination: Yes





Physical Exam





- Vital signs


Vitals: 





                                        











Temp Pulse Resp BP Pulse Ox


 


 98.3 F   88   16   164/71 H  100 


 


 12/23/19 11:27  12/23/19 11:27  12/23/19 11:27  12/23/19 11:27  12/23/19 11:27














- General


General appearance: Alert


Notes: 





Patient pale, poor eye contact





- Respiratory


Respiratory status: No respiratory distress





Course





- Vital Signs


Vital signs: 





                                        











Temp Pulse Resp BP Pulse Ox


 


 98.3 F   88   16   164/71 H  100 


 


 12/23/19 11:27  12/23/19 11:27  12/23/19 11:27  12/23/19 11:27  12/23/19 11:27














Doctor's Discharge





- Discharge


Referrals: 


AVIVA POTTER MD [Primary Care Provider] - Follow up as needed

## 2019-12-23 NOTE — ER DOCUMENT REPORT
ED General





- General


Chief Complaint: Rectal Bleeding


Stated Complaint: POSSIBLE BLEEDING FROM RECTUM/GENITAL AREA


Time Seen by Provider: 12/23/19 11:24


Primary Care Provider: 


AVIVA POTTER MD [Primary Care Provider] - Follow up as needed


TRAVEL OUTSIDE OF THE [place] IN LAST 30 DAYS: No





- Related Data


Allergies/Adverse Reactions: 


                                        





No Known Allergies Allergy (Verified 12/23/19 11:24)


   











Past Medical History





- General


Information source: Relative





- Social History


Smoking Status: Never Smoker


Family History: None


Patient has suicidal ideation: No


Patient has homicidal ideation: No





- Past Medical History


Cardiac Medical History: Reports: Hx Atrial Fibrillation, Hx Congestive Heart 

Failure, Hx Coronary Artery Disease, Hx Heart Attack, Hx Hypercholesterolemia, 

Hx Hypertension


Pulmonary Medical History: 


   Denies: Hx Asthma, Hx Bronchitis, Hx COPD, Hx Pneumonia, Hx Tuberculosis


Neurological Medical History: Reports: Hx Cerebrovascular Accident - 2007,2008, 

Hx Seizures - 10 years ago


Endocrine Medical History: Reports: Hx Diabetes Mellitus Type 1, Hx Diabetes 

Mellitus Type 2


Renal/ Medical History: Denies: Hx Peritoneal Dialysis


Musculoskeletal Medical History: Denies Hx Arthritis


Psychiatric Medical History: Reports: Hx Depression


Past Surgical History: Reports: Hx Cardiac Catheterization - stent 2004.  

Denies: Hx Hysterectomy, Hx Pacemaker





- Immunizations


Hx Diphtheria, Pertussis, Tetanus Vaccination: Yes


Hx Pneumococcal Vaccination: 01/01/10





Physical Exam





- Vital signs


Vitals: 


                                        











Temp Pulse Resp BP Pulse Ox


 


 98.3 F   88   16   164/71 H  100 


 


 12/23/19 11:27  12/23/19 11:27  12/23/19 11:27  12/23/19 11:27  12/23/19 11:27














- Notes


Notes: 





Patient is to the emergency department with 3 episodes of possible rectal 

bleeding over the past week.  First 2 episodes they just noticed some spotting 

in her underwear.  But today they said they noticed amount of bright red blood. 

The patient is not had any no complaints of abdominal pain nausea or vomiting.  

Is been no fevers or dysuria last bowel movement was 1 to 2 days ago and was 

brown in color.  No previous history of hemorrhoids.  They do report patient had

a fever about a week ago that resolved.  Has been decreased but she is 

tolerating liquids





Past medical history includes hypertension elevated cholesterol coronary artery 

disease with a stent CHF and atrial fibrillation as well as chronic kidney 

disease and dementia.  CVA with right-sided weakness and currently bedridden 

family  has recently placed the patient hospice because of her multiple 

medical problems





Social history she does not smoke or drink at all





Review of systems is limited because of the patient's mental status family is 

answer most of the questions.  Patient denies any chest pain or abdominal pain 

at this time





Medications patient is currently on Eliquis








PHYSICIAN EXAM -vital signs are noted triage note and note from triage reviewed


 


GENERAL: Well-appearing, well-nourished and in _no acute distress_____


 


HEAD: Atraumatic, normocephalic.


 


EYES: Pupils equal round and reactive to light, extraocular movements intact, 

sclera anicteric, conjunctiva are normal.


 


ENT: nares patent, oropharynx clear without exudates.  Moist mucous membranes.


 


NECK:  supple without lymphadenopathy


 


LUNGS: Breath sounds clear to auscultation bilaterally and equal.  No wheezes 

rales or rhonchi.


 


HEART: Regular rate and rhythm without murmurs


 


ABDOMEN: Soft, nontender, normoactive bowel sounds. 


 


EXTREMITIES: No deformity, no  edema. 


 


NEUROLOGICAL: Is awake she can tell me her name that she is in the hospital and 

the year.  Speech is clear she got this hemiplegia on the right have good 

movement on the left


 


PSYCH: Normal mood, normal affect.


 


SKIN: Warm, Dry, stage III decubitus ulcer in the sacral area.  There are some 

dried blood on the bandage no active bleeding.





Rectal stool was obtained by nursing staff reported as brown and heme-negative. 

I did examine the rectal externally with nursing staff present there is no S of 

hemorrhoids or anal fissure.





Pelvic exam was performed with nursing staff present.  The external genitalia 

normal.  There is appears to be some irritation around the urethra.  With some 

tenderness in the area.  There is no bleeding gross bleeding from the vault.  

Digital exam was limited because of pain but there is no blood on my exam finger

at least at the opening of the introitus





BACK-nontender in the midline





Differential diagnosis includes UTI cystitis atrophic vaginitis diverticulitis





Course





- Re-evaluation


Re-evalutation: 





12/23/19 16:42


ED patient is remained stable they attempted CRISTOFER cath without any urine so she 

was given a 500 cc bolus cath does show some hematuria





Medical decision making patient presents with bleeding on unclear source.  Stool

was heme-negative.  She had no external lesions on her rectum.  No blood in the 

vaginal vault on brief exam.  Does have irritation of the urethra and evidence 

of urinary tract infection which I think is the most likely source.  The 

discussion with family members.  There is been some conflict is 1 family member 

wants testing done the other one does not.  I did discuss the findings with them

at this time I think the most likely the urine.  Commended that we treat her UTI

and then still has some bleeding proceed with further evaluation is a 

comfortable with that.  They do understand that further evaluation would include

a pelvic exam which would be very limited because of patient's previous stroke 

and she is not fully cooperative.  Plan at this time will go and treat her with 

Omnicef and has been advised to make sure she drinks plenty of fluids and 

follow-up with her family doctor in 3 to 5 days





Patient 1





Dictation was done using voice recognition software.  There may be some 

grammatical errors which are unintentional





I discussed results of laboratory findings and diagnostic test with 

patient/family.  The treatment plan was explained and I reviewed the discharge 

instructions with them.  Questions were answered.  The patient/family verbalizes

understanding





- Vital Signs


Vital signs: 


                                        











Temp Pulse Resp BP Pulse Ox


 


 98.3 F   88   16   164/71 H  100 


 


 12/23/19 11:27  12/23/19 11:27  12/23/19 11:27  12/23/19 11:27  12/23/19 11:27














- Laboratory


Result Diagrams: 


                                 12/23/19 11:45





                                 12/23/19 11:45


Laboratory results interpreted by me: 


                                        











  12/23/19 12/23/19 12/23/19





  11:45 11:45 11:45


 


RBC  3.16 L  


 


Hgb  9.1 L  


 


Hct  27.3 L  


 


RDW  15.8 H  


 


Lymph % (Auto)  11.4 L  


 


Seg Neutrophils %  79.1 H  


 


APTT    36.4 H


 


BUN   44 H 


 


Creatinine   2.41 H 


 


Est GFR ( Amer)   24 L 


 


Est GFR (MDRD) Non-Af   19 L 


 


Glucose   127 H 


 


Urine Protein   


 


Urine Blood   


 


Ur Leukocyte Esterase   














  12/23/19





  14:12


 


RBC 


 


Hgb 


 


Hct 


 


RDW 


 


Lymph % (Auto) 


 


Seg Neutrophils % 


 


APTT 


 


BUN 


 


Creatinine 


 


Est GFR ( Amer) 


 


Est GFR (MDRD) Non-Af 


 


Glucose 


 


Urine Protein  100 H


 


Urine Blood  SMALL H


 


Ur Leukocyte Esterase  MODERATE H














Discharge





- Discharge


Clinical Impression: 


Chronic kidney disease (CKD)


Qualifiers:


 Chronic kidney disease stage: unspecified stage Qualified Code(s): N18.9 - 

Chronic kidney disease, unspecified





UTI (urinary tract infection)


Qualifiers:


 Urinary tract infection type: acute cystitis 





Disposition: HOME, SELF-CARE


Instructions:  Urinary Tract Infection ([facility])


Additional Instructions: 





Please review the discharge instructions, they will tell you about your 

disease/injury and what you need to return to the ED for





Return to the ED if you feel worse or can follow-up with your family doctor





Make sure patient drinks plenty of fluids





Follow-up with your family doctor in 3 to 5 days





Return if patient develops blood in her stools black stools or heavy vaginal 

bleeding


Prescriptions: 


Cefdinir [Omnicef 250 mg/5 mL Suspension] 6 ml PO ASDIR PRN #50 ml


 PRN Reason: 


Referrals: 


AVIVA POTTER MD [Primary Care Provider] - Follow up as needed